# Patient Record
Sex: MALE | Race: WHITE | Employment: PART TIME | URBAN - NONMETROPOLITAN AREA
[De-identification: names, ages, dates, MRNs, and addresses within clinical notes are randomized per-mention and may not be internally consistent; named-entity substitution may affect disease eponyms.]

---

## 2017-10-28 ENCOUNTER — HOSPITAL ENCOUNTER (EMERGENCY)
Facility: HOSPITAL | Age: 27
Discharge: HOME | End: 2017-10-28
Attending: EMERGENCY MEDICINE | Admitting: EMERGENCY MEDICINE
Payer: OTHER MISCELLANEOUS

## 2017-10-28 VITALS
HEART RATE: 96 BPM | SYSTOLIC BLOOD PRESSURE: 116 MMHG | DIASTOLIC BLOOD PRESSURE: 77 MMHG | TEMPERATURE: 98.5 F | WEIGHT: 137 LBS | BODY MASS INDEX: 23.39 KG/M2 | HEIGHT: 64 IN | RESPIRATION RATE: 18 BRPM | OXYGEN SATURATION: 97 %

## 2017-10-28 DIAGNOSIS — M54.9 ACUTE UPPER BACK PAIN: Primary | ICD-10-CM

## 2017-10-28 PROCEDURE — 99284 EMERGENCY DEPT VISIT MOD MDM: CPT | Mod: 25

## 2017-10-28 PROCEDURE — 25000128 H RX IP 250 OP 636: Performed by: EMERGENCY MEDICINE

## 2017-10-28 PROCEDURE — 96372 THER/PROPH/DIAG INJ SC/IM: CPT

## 2017-10-28 PROCEDURE — 99284 EMERGENCY DEPT VISIT MOD MDM: CPT | Performed by: EMERGENCY MEDICINE

## 2017-10-28 RX ORDER — CYCLOBENZAPRINE HCL 10 MG
5-10 TABLET ORAL 3 TIMES DAILY PRN
Qty: 30 TABLET | Refills: 1 | Status: SHIPPED | OUTPATIENT
Start: 2017-10-28 | End: 2017-12-29

## 2017-10-28 RX ORDER — IBUPROFEN 800 MG/1
800 TABLET, FILM COATED ORAL EVERY 8 HOURS PRN
Qty: 60 TABLET | Refills: 0 | Status: SHIPPED | OUTPATIENT
Start: 2017-10-28 | End: 2017-11-05

## 2017-10-28 RX ORDER — KETOROLAC TROMETHAMINE 30 MG/ML
30 INJECTION, SOLUTION INTRAMUSCULAR; INTRAVENOUS ONCE
Status: COMPLETED | OUTPATIENT
Start: 2017-10-28 | End: 2017-10-28

## 2017-10-28 RX ADMIN — KETOROLAC TROMETHAMINE 30 MG: 30 INJECTION, SOLUTION INTRAMUSCULAR at 08:19

## 2017-10-28 NOTE — ED AVS SNAPSHOT
[facility]    [address]    [place] [address]    Phone:  [phone]                                       Ryan Marte   MRN: [id number]    Department:  [facility]   Date of Visit:  10/28/2017           After Visit Summary Signature Page     I have received my discharge instructions, and my questions have been answered. I have discussed any challenges I see with this plan with the nurse or doctor.    ..........................................................................................................................................  Patient/Patient Representative Signature      ..........................................................................................................................................  Patient Representative Print Name and Relationship to Patient    ..................................................               ................................................  Date                                            Time    ..........................................................................................................................................  Reviewed by Signature/Title    ...................................................              ..............................................  Date                                                            Time

## 2017-10-28 NOTE — ED PROVIDER NOTES
"  History     Chief Complaint   Patient presents with     Back Pain     \"upper back pain occurred at work around 0535\"     HPI  Ryan Marte is a 27 year old male who presents to the emergency department from his workplace.  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  He started experiencing intrascapular upper back pain at 5:00 this morning.  Pain is worsened and is rated as 6/10.  Worse when taking deep breaths.  Has not tried any over-the-counter pain medications.  He is walking involves a lot of heavy lifting and did eat a lot of that last night.  This is Worker's Compensation related visit.  The pain is now located in the intrascapular region and is mostly muscular.  Does not radiate, aggravated by twisting movement of the chest wall or taking a deep breath.  No known relieving factors.  Denies any shortness of breath, chest pain, palpitations, orthopnea or paroxysmal nocturnal dyspnea.  No prior cardiac history.  Has never hurt his back in the past.    Problem List:    There are no active problems to display for this patient.       Past Medical History:    No past medical history on file.    Past Surgical History:    Past Surgical History:   Procedure Laterality Date     APPENDECTOMY       BIOPSY OF SKIN LESION         Family History:    No family history on file.    Social History:  Marital Status:  Single [1]  Social History   Substance Use Topics     Smoking status: Never Smoker     Smokeless tobacco: Not on file     Alcohol use Not on file        Medications:      ibuprofen (ADVIL/MOTRIN) 800 MG tablet   cyclobenzaprine (FLEXERIL) 10 MG tablet         Review of Systems   All other systems reviewed and are negative.      Physical Exam   BP: 109/70  Pulse: 96  Heart Rate: 96  Temp: 97.9  F (36.6  C)  Resp: 16  Height: 162.6 cm (5' 4\")  Weight: 62.1 kg (137 lb)  SpO2: 97 %      Physical Exam   Constitutional: He appears well-developed and well-nourished. No distress.   HENT:   Head: " Normocephalic and atraumatic.   Mouth/Throat: Oropharynx is clear and moist. No oropharyngeal exudate.   Eyes: Pupils are equal, round, and reactive to light. No scleral icterus.   Cardiovascular: Normal rate, regular rhythm, normal heart sounds and intact distal pulses.    Pulmonary/Chest: Effort normal and breath sounds normal. No respiratory distress.   Abdominal: Soft. Bowel sounds are normal. There is no tenderness.   Musculoskeletal: He exhibits tenderness. He exhibits no edema.        Back:    Skin: Skin is warm. No rash noted. He is not diaphoretic.   Nursing note and vitals reviewed.      ED Course   Stable ED course.  Toradol IM given in the ED.    ED Course     Procedures         Labs Ordered and Resulted from Time of ED Arrival Up to the Time of Departure from the ED - No data to display    Assessments & Plan (with Medical Decision Making)   Upper back pain: Interscapular pain mostly musculoskeletal.  Patient given Toradol IM in the ED.  Discharged home on Flexeril and Motrin 800 mg every 8 hours as needed for pain.  Advised follow-up with PCP on Monday.  May require physical therapy if he is not better or does not improve in the next few days.  I will answered all questions.  Discharged home.  Work restriction should not lift more than 9 pounds for the next 3 days.    I have reviewed the nursing notes.    I have reviewed the findings, diagnosis, plan and need for follow up with the patient.    Discharge Medication List as of 10/28/2017  8:38 AM      START taking these medications    Details   ibuprofen (ADVIL/MOTRIN) 800 MG tablet Take 1 tablet (800 mg) by mouth every 8 hours as needed for moderate pain, Disp-60 tablet, R-0, E-Prescribe      cyclobenzaprine (FLEXERIL) 10 MG tablet Take 0.5-1 tablets (5-10 mg) by mouth 3 times daily as needed for muscle spasms, Disp-30 tablet, R-1, E-Prescribe             Final diagnoses:   Acute upper back pain       10/28/2017   [facility]     Michael  Montrell MCDONALD MD  10/28/17 1120

## 2017-10-28 NOTE — ED NOTES
Condition stable, understands discharge instructions, prescription x 2 e-scribed to Pharmacy of choice.  Discharged home.

## 2017-10-28 NOTE — DISCHARGE INSTRUCTIONS

## 2017-10-28 NOTE — ED AVS SNAPSHOT
[facility]    [address]    [place] [address]    Phone:  [phone]                                       Ryan Marte   MRN: [id number]    Department:  [facility]   Date of Visit:  10/28/2017           Patient Information     Date Of Birth          1990        Your diagnoses for this visit were:     Acute upper back pain        You were seen by Montrell Rodriguez MD.        Discharge Instructions         General Neck and Back Pain    Both neck and back pain are usually caused by injury to the muscles or ligaments of the spine. Sometimes the disks that separate each bone of the spine may cause pain by pressing on a nearby nerve. Back and neck pain may appear after a sudden twisting or bending force (such as in a car accident), or sometimes after a simple awkward movement. In either case, muscle spasm is often present and adds to the pain.  Acute neck and back pain usually gets better in 1 to 2 weeks. Pain related to disk disease, arthritis in the spinal joints or spinal stenosis (narrowing of the spinal canal) can become chronic and last for months or years.  Back and neck pain are common problems. Most people feel better in 1 or 2 weeks, and most of the rest in 1 to 2 months. Most people can remain active.  People experience and describe pain differently.    Pain can be sharp, stabbing, shooting, aching, cramping, or burning    Movement, standing, bending, lifting, sitting, or walking may worsen the pain    Pain can be localized to one spot or area, or it can be more generalized    Pain can spread or radiate upwards, downwards, to the front, or go down your arms    Muscle spasm may occur.  Most of the time mechanical problems with the muscles or spine cause the pain. it is usually caused by an injury, whether known or not, to the muscles or ligaments. While illnesses can cause back pain, it is usually not caused by a serious illness. Pain is usually related to physical  activity, whether sports, exercise, work, or normal activity. Sometimes it can occur without an identifiable cause. This can happen simply by stretching or moving wrong, without noting pain at the time. Other causes include:    Overexertion, lifting, pushing, pulling incorrectly or too aggressively.    Sudden twisting, bending or stretching from an accident (car or fall), or accidental movement.    Poor posture    Poor conditioning, lack of regular exercise    Spinal disc disease or arthritis    Stress    Pregnancy, or illness like appendicitis, bladder or kidney infection, pelvic infections   Home care    For neck pain: Use a comfortable pillow that supports the head and keeps the spine in a neutral position. The position of the head should not be tilted forward or backward.    When in bed, try to find a position of comfort. A firm mattress is best. Try lying flat on your back with pillows under your knees. You can also try lying on your side with your knees bent up towards your chest and a pillow between your knees.    At first, do not try to stretch out the sore spots. If there is a strain, it is not like the good soreness you get after exercising without an injury. In this case, stretching may make it worse.    Avoid prolonged sitting, long car rides or travel. This puts more stress on the lower back than standing or walking.    During the first 24 to 72 hours after an injury, apply an ice pack to the painful area for 20 minutes and then remove it for 20 minutes over a period of 60 to 90 minutes or several times a day.     You can alternate ice and heat therapies. Talk with your healthcare provider about the best treatment for your back or neck pain. As a safety precaution, do not use a heating pad at bedtime. Sleeping with a heating pad can lead to skin burns or tissue damage.    Therapeutic massage can help relax the back and neck muscles without stretching them.    Be aware of safe lifting methods and do not  lift anything over 15 pounds until all the pain is gone.  Medications  Talk to your healthcare provider before using medicine, especially if you have other medical problems or are taking other medicines.    You may use over-the-counter medicine to control pain, unless another pain medicine was prescribed. If you have chronic conditions like diabetes, liver or kidney disease, stomach ulcers,  gastrointestinal bleeding, or are taking blood thinner medicines.    Be careful if you are given pain medicines, narcotics, or medicine for muscle spasm. They can cause drowsiness, and can affect your coordination, reflexes, and judgment. Do not drive or operate heavy machinery.  Follow-up care  Follow up with your healthcare provider, or as advised. Physical therapy or further tests may be needed.  If X-rays were taken, you will be notified of any new findings that may affect your care.  Call 911  Seek emergency medical care if any of the following occur:    Trouble breathing    Confusion    Very drowsy or trouble awakening    Fainting or loss of consciousness    Rapid or very slow heart rate    Loss of bowel or bladder control  When to seek medical advice  Call your healthcare provider right away if any of these occur:    Pain becomes worse or spreads into your arms or legs    Weakness, numbness or pain in one or both arms or legs    Numbness in the groin area    Difficulty walking    Fever of 100.4 F (38 C) or higher, or as directed by your healthcare provider  Date Last Reviewed: 7/1/2016 2000-2017 [employer]. [address] [place] [address]. All rights reserved. This information is not intended as a substitute for professional medical care. Always follow your healthcare professional's instructions.             Review of your medicines      START taking        Dose / Directions Last dose taken    cyclobenzaprine 10 MG tablet   Commonly known as:  FLEXERIL   Dose:  5-10 mg   Quantity:  30 tablet         Take 0.5-1 tablets (5-10 mg) by mouth 3 times daily as needed for muscle spasms   Refills:  1        ibuprofen 800 MG tablet   Commonly known as:  ADVIL/MOTRIN   Dose:  800 mg   Quantity:  60 tablet        Take 1 tablet (800 mg) by mouth every 8 hours as needed for moderate pain   Refills:  0                Prescriptions were sent or printed at these locations (2 Prescriptions)                   [id number] - [place], [place] - [address]   [address] , [place] [address]    Telephone:  [phone]   Fax:  [phone]   Hours:                  E-Prescribed (2 of 2)         ibuprofen (ADVIL/MOTRIN) 800 MG tablet               cyclobenzaprine (FLEXERIL) 10 MG tablet                Orders Needing Specimen Collection     None      Pending Results     No orders found from 10/26/2017 to 10/29/2017.            Pending Culture Results     No orders found from 10/26/2017 to 10/29/2017.            Thank you for choosing [facility]       Thank you for choosing [facility] for your care. Our goal is always to provide you with excellent care. Hearing back from our patients is one way we can continue to improve our services. Please take a few minutes to complete the written survey that you may receive in the mail after you visit with us. Thank you!        [employer] Information     [employer] gives you secure access to your electronic health record. If you see a primary care provider, you can also send messages to your care team and make appointments. If you have questions, please call your primary care clinic.  If you do not have a primary care provider, please call [phone] and they will assist you.        Care EveryWhere ID     This is your Care EveryWhere ID. This could be used by other organizations to access your [facility] medical records  [id number]        Equal Access to Services     ROMMEL VALENCIA AH: Miguel Yousif, matt valente, shahbaz ba  ah. So [facility] [phone].    ATENCIÓN: Si habla español, tiene a braden disposición servicios gratuitos de asistencia lingüística. Llame al [phone].    We comply with applicable federal civil rights laws and [place] laws. We do not discriminate on the basis of race, color, national origin, age, disability, sex, sexual orientation, or gender identity.            After Visit Summary       This is your record. Keep this with you and show to your community pharmacist(s) and doctor(s) at your next visit.

## 2017-12-29 ENCOUNTER — HOSPITAL ENCOUNTER (EMERGENCY)
Facility: HOSPITAL | Age: 27
Discharge: HOME | End: 2017-12-29
Admitting: FAMILY MEDICINE
Payer: COMMERCIAL

## 2017-12-29 ENCOUNTER — APPOINTMENT (OUTPATIENT)
Dept: GENERAL RADIOLOGY | Facility: HOSPITAL | Age: 27
End: 2017-12-29
Attending: FAMILY MEDICINE
Payer: COMMERCIAL

## 2017-12-29 VITALS
HEIGHT: 64 IN | HEART RATE: 89 BPM | RESPIRATION RATE: 18 BRPM | SYSTOLIC BLOOD PRESSURE: 116 MMHG | DIASTOLIC BLOOD PRESSURE: 69 MMHG | TEMPERATURE: 98.9 F | OXYGEN SATURATION: 96 %

## 2017-12-29 DIAGNOSIS — M94.0 COSTOCHONDRITIS: ICD-10-CM

## 2017-12-29 DIAGNOSIS — R07.89 ATYPICAL CHEST PAIN: ICD-10-CM

## 2017-12-29 LAB
ALBUMIN SERPL-MCNC: 3.8 G/DL (ref 3.4–5)
ALP SERPL-CCNC: 75 U/L (ref 40–150)
ALT SERPL W P-5'-P-CCNC: 40 U/L (ref 0–70)
ANION GAP SERPL CALCULATED.3IONS-SCNC: 9 MMOL/L (ref 3–14)
AST SERPL W P-5'-P-CCNC: 19 U/L (ref 0–45)
BASOPHILS # BLD AUTO: 0 10E9/L (ref 0–0.2)
BASOPHILS NFR BLD AUTO: 0.5 %
BILIRUB SERPL-MCNC: 0.7 MG/DL (ref 0.2–1.3)
BUN SERPL-MCNC: 15 MG/DL (ref 7–30)
CALCIUM SERPL-MCNC: 10.2 MG/DL (ref 8.5–10.1)
CHLORIDE SERPL-SCNC: 105 MMOL/L (ref 94–109)
CO2 SERPL-SCNC: 26 MMOL/L (ref 20–32)
CREAT SERPL-MCNC: 0.74 MG/DL (ref 0.66–1.25)
D DIMER PPP DDU-MCNC: <200 NG/ML D-DU (ref 0–300)
DIFFERENTIAL METHOD BLD: NORMAL
EOSINOPHIL # BLD AUTO: 0.4 10E9/L (ref 0–0.7)
EOSINOPHIL NFR BLD AUTO: 5.5 %
ERYTHROCYTE [DISTWIDTH] IN BLOOD BY AUTOMATED COUNT: 11.8 % (ref 10–15)
FLUAV+FLUBV AG SPEC QL: NEGATIVE
FLUAV+FLUBV AG SPEC QL: NEGATIVE
GFR SERPL CREATININE-BSD FRML MDRD: >90 ML/MIN/1.7M2
GLUCOSE SERPL-MCNC: 85 MG/DL (ref 70–99)
HCT VFR BLD AUTO: 43.2 % (ref 40–53)
HGB BLD-MCNC: 15.3 G/DL (ref 13.3–17.7)
IMM GRANULOCYTES # BLD: 0 10E9/L (ref 0–0.4)
IMM GRANULOCYTES NFR BLD: 0.3 %
LIPASE SERPL-CCNC: 118 U/L (ref 73–393)
LYMPHOCYTES # BLD AUTO: 2.7 10E9/L (ref 0.8–5.3)
LYMPHOCYTES NFR BLD AUTO: 34.4 %
MCH RBC QN AUTO: 28.1 PG (ref 26.5–33)
MCHC RBC AUTO-ENTMCNC: 35.4 G/DL (ref 31.5–36.5)
MCV RBC AUTO: 79 FL (ref 78–100)
MONOCYTES # BLD AUTO: 0.6 10E9/L (ref 0–1.3)
MONOCYTES NFR BLD AUTO: 7.8 %
NEUTROPHILS # BLD AUTO: 4 10E9/L (ref 1.6–8.3)
NEUTROPHILS NFR BLD AUTO: 51.5 %
NRBC # BLD AUTO: 0 10*3/UL
NRBC BLD AUTO-RTO: 0 /100
NT-PROBNP SERPL-MCNC: 29 PG/ML (ref 0–450)
PLATELET # BLD AUTO: 216 10E9/L (ref 150–450)
POTASSIUM SERPL-SCNC: 3.4 MMOL/L (ref 3.4–5.3)
PROT SERPL-MCNC: 7.2 G/DL (ref 6.8–8.8)
RBC # BLD AUTO: 5.44 10E12/L (ref 4.4–5.9)
SODIUM SERPL-SCNC: 140 MMOL/L (ref 133–144)
SPECIMEN SOURCE: NORMAL
TROPONIN I SERPL-MCNC: <0.015 UG/L (ref 0–0.04)
TSH SERPL DL<=0.005 MIU/L-ACNC: 1.58 MU/L (ref 0.4–4)
TSH SERPL DL<=0.005 MIU/L-ACNC: 1.58 MU/L (ref 0.4–4)
WBC # BLD AUTO: 7.8 10E9/L (ref 4–11)

## 2017-12-29 PROCEDURE — 83880 ASSAY OF NATRIURETIC PEPTIDE: CPT | Performed by: FAMILY MEDICINE

## 2017-12-29 PROCEDURE — 80053 COMPREHEN METABOLIC PANEL: CPT | Performed by: FAMILY MEDICINE

## 2017-12-29 PROCEDURE — 84443 ASSAY THYROID STIM HORMONE: CPT | Performed by: FAMILY MEDICINE

## 2017-12-29 PROCEDURE — 83690 ASSAY OF LIPASE: CPT | Performed by: FAMILY MEDICINE

## 2017-12-29 PROCEDURE — 93010 ELECTROCARDIOGRAM REPORT: CPT | Performed by: INTERNAL MEDICINE

## 2017-12-29 PROCEDURE — 84484 ASSAY OF TROPONIN QUANT: CPT | Performed by: FAMILY MEDICINE

## 2017-12-29 PROCEDURE — 87804 INFLUENZA ASSAY W/OPTIC: CPT | Performed by: FAMILY MEDICINE

## 2017-12-29 PROCEDURE — 85379 FIBRIN DEGRADATION QUANT: CPT | Performed by: FAMILY MEDICINE

## 2017-12-29 PROCEDURE — 99285 EMERGENCY DEPT VISIT HI MDM: CPT | Mod: 25

## 2017-12-29 PROCEDURE — 96374 THER/PROPH/DIAG INJ IV PUSH: CPT

## 2017-12-29 PROCEDURE — 71020 XR CHEST 2 VW: CPT | Mod: TC

## 2017-12-29 PROCEDURE — 99285 EMERGENCY DEPT VISIT HI MDM: CPT | Performed by: FAMILY MEDICINE

## 2017-12-29 PROCEDURE — 25000128 H RX IP 250 OP 636: Performed by: FAMILY MEDICINE

## 2017-12-29 PROCEDURE — 36415 COLL VENOUS BLD VENIPUNCTURE: CPT | Performed by: FAMILY MEDICINE

## 2017-12-29 PROCEDURE — 85025 COMPLETE CBC W/AUTO DIFF WBC: CPT | Performed by: FAMILY MEDICINE

## 2017-12-29 PROCEDURE — 93005 ELECTROCARDIOGRAM TRACING: CPT

## 2017-12-29 RX ORDER — KETOROLAC TROMETHAMINE 15 MG/ML
30 INJECTION, SOLUTION INTRAMUSCULAR; INTRAVENOUS ONCE
Status: COMPLETED | OUTPATIENT
Start: 2017-12-29 | End: 2017-12-29

## 2017-12-29 RX ADMIN — KETOROLAC TROMETHAMINE 30 MG: 15 INJECTION, SOLUTION INTRAMUSCULAR; INTRAVENOUS at 05:45

## 2017-12-29 ASSESSMENT — ENCOUNTER SYMPTOMS
AICD PROBLEM: 0
CLAUDICATION: 0
ABDOMINAL PAIN: 0
FATIGUE: 0
ALTERED MENTAL STATUS: 0
EYES NEGATIVE: 1
NEAR-SYNCOPE: 0
SHORTNESS OF BREATH: 0
DIAPHORESIS: 0
SYNCOPE: 0
VOMITING: 0
NAUSEA: 0
ORTHOPNEA: 0
BACK PAIN: 0
PALPITATIONS: 0
HEARTBURN: 0
HEADACHES: 0
FEVER: 0
NUMBNESS: 0
LOWER EXTREMITY EDEMA: 0
ENDOCRINE NEGATIVE: 1
PND: 0
WEAKNESS: 0
COUGH: 0
ANOREXIA: 0
TROUBLE SWALLOWING: 0
DIZZINESS: 1

## 2017-12-29 NOTE — ED AVS SNAPSHOT
[facility]    [address]    [place] [address]    Phone:  [phone]                                       Ryan Marte   MRN: [id number]    Department[facility]  [facility]   Date of Visit:  12/29/2017           After Visit Summary Signature Page     I have received my discharge instructions, and my questions have been answered. I have discussed any challenges I see with this plan with the nurse or doctor.    ..........................................................................................................................................  Patient/Patient Representative Signature      ..........................................................................................................................................  Patient Representative Print Name and Relationship to Patient    ..................................................               ................................................  Date                                            Time    ..........................................................................................................................................  Reviewed by Signature/Title    ...................................................              ..............................................  Date                                                            Time

## 2017-12-29 NOTE — ED AVS SNAPSHOT
[facility]    [address] [place] [address]    Phone:  [phone]                                       Ryan Marte   MRN: [id number]    Department[facility]  [facility]   Date of Visit:  12/29/2017           Patient Information     Date Of Birth          1990        Your diagnoses for this visit were:     Costochondritis     Atypical chest pain       Follow-up Information     Follow up with CARLITA Parkinson MD.    Specialty:  Family Practice    Why:  If symptoms worsen    Contact information:    [address] [address]  [phone]          Discharge Instructions         Chest Wall Pain: Costochondritis    The chest pain that you have had today is caused by costochondritis. This condition is caused by an inflammation of the cartilage joining your ribs to your breastbone. It is not caused by heart or lung problems. Your healthcare team has made sure that the chest pain you feel is not from a life threatening cause of chest pain such as heart attack, collapsed lung, blood clot in the lung, tear in the aorta, or esophageal rupture. The inflammation may have been brought on by a blow to the chest, lifting heavy objects, intense exercise, or an illness that made you cough and sneeze a lot. It often occurs during times of emotional stress. It can be painful, but it is not dangerous. It usually goes away in 1 to 2 weeks. But it may happen again. Rarely, a more serious condition may cause symptoms similar to costochondritis. That s why it s important to watch for the warning signs listed below.  Home care  Follow these guidelines when caring for yourself at home:    If you feel that emotional stress is a cause of your condition, try to figure out the sources of that stress. It may not be obvious. Learn ways to deal with the stress in your life. This can include regular exercise, muscle relaxation, meditation, or simply taking time out for  yourself.    You may use acetaminophen, ibuprofen, or naproxen to control pain, unless another pain medicine was prescribed. If you have liver or kidney disease or ever had a stomach ulcer, talk with your healthcare provider before using these medicines.    You can also help ease pain by using a hot, wet compress or heating pad. Use this with or without a medicated skin cream that helps relieves pain.    Do stretching exercise as advised by your provider.    Take any prescribed medicines as directed.  Follow-up care  Follow up with your healthcare provider, or as advised, if you do not start to get better in the next 2 days.  When to seek medical advice  Call your healthcare provider right away if any of these occur:    A change in the type of pain. Call if it feels different, becomes more serious, lasts longer, or spreads into your shoulder, arm, neck, jaw, or back.    Shortness of breath or pain gets worse when you breathe    Weakness, dizziness, or fainting    Cough with dark-colored sputum (phlegm) or blood    Abdominal pain    Dark red or black stools    Fever of 100.4 F (38 C) or higher, or as directed by your healthcare provider  Date Last Reviewed: 12/1/2016 2000-2017 [employer]. [address] [place] [address]. All rights reserved. This information is not intended as a substitute for professional medical care. Always follow your healthcare professional's instructions.             Review of your medicines      Notice     You have not been prescribed any medications.            Procedures and tests performed during your visit     CBC with platelets differential    Cardiac Continuous Monitoring    Comprehensive metabolic panel    D-Dimer ([facility])    EKG 12 lead    Influenza A/B antigen    Lipase    Nt probnp inpatient (BNP)    TSH    TSH with free T4 reflex    Troponin I    XR Chest 2 Views      Orders Needing Specimen Collection     None      Pending Results     Date and Time Order  Name Status Description    12/29/2017 0458 XR Chest 2 Views In process             Pending Culture Results     No orders found from 12/27/2017 to 12/30/2017.            Thank you for choosing [facility]       Thank you for choosing [facility] for your care. Our goal is always to provide you with excellent care. Hearing back from our patients is one way we can continue to improve our services. Please take a few minutes to complete the written survey that you may receive in the mail after you visit with us. Thank you!        [employer] Information     [employer] gives you secure access to your electronic health record. If you see a primary care provider, you can also send messages to your care team and make appointments. If you have questions, please call your primary care clinic.  If you do not have a primary care provider, please call [phone] and they will assist you.        Care EveryWhere ID     This is your Care EveryWhere ID. This could be used by other organizations to access your [facility] medical records  [id number]        Equal Access to Services     ROMMEL VALENCIA AH: Hadii dieudonne Yousif, matt valente, nkechi olivares, shahbaz donis . So [facility] [phone].    ATENCIÓN: Si habla español, tiene a braden disposición servicios gratuitos de asistencia lingüística. Llame al [phone].    We comply with applicable federal civil rights laws and [place] laws. We do not discriminate on the basis of race, color, national origin, age, disability, sex, sexual orientation, or gender identity.            After Visit Summary       This is your record. Keep this with you and show to your community pharmacist(s) and doctor(s) at your next visit.

## 2017-12-29 NOTE — ED NOTES
Reviewed discharge instructions with pt, verbalized understanding, Copy of discharge instructions provided prior to leaving, no further questions asked.

## 2017-12-29 NOTE — DISCHARGE INSTRUCTIONS
Chest Wall Pain: Costochondritis    The chest pain that you have had today is caused by costochondritis. This condition is caused by an inflammation of the cartilage joining your ribs to your breastbone. It is not caused by heart or lung problems. Your healthcare team has made sure that the chest pain you feel is not from a life threatening cause of chest pain such as heart attack, collapsed lung, blood clot in the lung, tear in the aorta, or esophageal rupture. The inflammation may have been brought on by a blow to the chest, lifting heavy objects, intense exercise, or an illness that made you cough and sneeze a lot. It often occurs during times of emotional stress. It can be painful, but it is not dangerous. It usually goes away in 1 to 2 weeks. But it may happen again. Rarely, a more serious condition may cause symptoms similar to costochondritis. That s why it s important to watch for the warning signs listed below.  Home care  Follow these guidelines when caring for yourself at home:    If you feel that emotional stress is a cause of your condition, try to figure out the sources of that stress. It may not be obvious. Learn ways to deal with the stress in your life. This can include regular exercise, muscle relaxation, meditation, or simply taking time out for yourself.    You may use acetaminophen, ibuprofen, or naproxen to control pain, unless another pain medicine was prescribed. If you have liver or kidney disease or ever had a stomach ulcer, talk with your healthcare provider before using these medicines.    You can also help ease pain by using a hot, wet compress or heating pad. Use this with or without a medicated skin cream that helps relieves pain.    Do stretching exercise as advised by your provider.    Take any prescribed medicines as directed.  Follow-up care  Follow up with your healthcare provider, or as advised, if you do not start to get better in the next 2 days.  When to seek medical  advice  Call your healthcare provider right away if any of these occur:    A change in the type of pain. Call if it feels different, becomes more serious, lasts longer, or spreads into your shoulder, arm, neck, jaw, or back.    Shortness of breath or pain gets worse when you breathe    Weakness, dizziness, or fainting    Cough with dark-colored sputum (phlegm) or blood    Abdominal pain    Dark red or black stools    Fever of 100.4 F (38 C) or higher, or as directed by your healthcare provider  Date Last Reviewed: 12/1/2016 2000-2017 [employer]. [address] [place] [address]. All rights reserved. This information is not intended as a substitute for professional medical care. Always follow your healthcare professional's instructions.

## 2017-12-29 NOTE — ED NOTES
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 States has no pain after waking for his shifts, but pain occurs around 2300 when working past 2 nights. Also reports that is experiencing some left sided arm pain and numbness. Reports it \"feels like heart burn.\" See assessments, call light placed within reach.   "

## 2017-12-29 NOTE — ED PROVIDER NOTES
History     Chief Complaint   Patient presents with     Chest Pain     started yesterday     Patient is a 27 year old male presenting with chest pain.   Chest Pain   Pain location:  L chest  Pain quality: sharp    Pain radiates to:  L arm  Pain severity:  Severe  Onset quality:  Gradual  Duration:  1 day  Timing:  Constant  Progression:  Improving  Chronicity:  New  Context: breathing and movement    Context: not drug use, not eating, not intercourse, not lifting, not raising an arm, not at rest, not stress and not trauma    Relieved by:  Nitroglycerin and aspirin  Worsened by:  Deep breathing and movement  Ineffective treatments:  Antacids  Associated symptoms: anxiety and dizziness    Associated symptoms: no abdominal pain, no AICD problem, no altered mental status, no anorexia, no back pain, no claudication, no cough, no diaphoresis, no dysphagia, no fatigue, no fever, no headache, no heartburn, no lower extremity edema, no nausea, no near-syncope, no numbness, no orthopnea, no palpitations, no PND, no shortness of breath, no syncope, no vomiting and no weakness    Risk factors: male sex    Risk factors: no aortic disease, no birth control, no coronary artery disease, no diabetes mellitus, no Concha-Danlos syndrome, no high cholesterol, no hypertension, no immobilization, no Marfan's syndrome, not obese, not pregnant, no prior DVT/PE, no smoking and no surgery      Ryan Marte is a 27 year old male who presents for chest pain     Problem List:    There are no active problems to display for this patient.       Past Medical History:    History reviewed. No pertinent past medical history.    Past Surgical History:    Past Surgical History:   Procedure Laterality Date     APPENDECTOMY       BIOPSY OF SKIN LESION         Family History:    No family history on file.    Social History:  Marital Status:  Single [1]  Social History   Substance Use Topics     Smoking status: Never Smoker     Smokeless tobacco: Never  "Used     Alcohol use Yes      Comment: \"one yomi's hard lemonade on Sundays\"        Medications:      No current outpatient prescriptions on file.      Review of Systems   Constitutional: Negative for diaphoresis, fatigue and fever.   HENT: Negative.  Negative for trouble swallowing.    Eyes: Negative.    Respiratory: Negative for cough and shortness of breath.    Cardiovascular: Positive for chest pain. Negative for palpitations, orthopnea, claudication, syncope, PND and near-syncope.   Gastrointestinal: Negative for abdominal pain, anorexia, heartburn, nausea and vomiting.   Endocrine: Negative.    Genitourinary: Negative.    Musculoskeletal: Negative for back pain.   Neurological: Positive for dizziness. Negative for weakness, numbness and headaches.       Physical Exam   BP: 119/92  Pulse: 89  Temp: 99.2  F (37.3  C)  Resp: 16  Height: 162.6 cm (5' 4\")  SpO2: 98 %      Physical Exam   Constitutional: He is oriented to person, place, and time. He appears well-developed and well-nourished. No distress.   HENT:   Head: Normocephalic and atraumatic.   Neck: Normal range of motion. Neck supple. No JVD present. No tracheal deviation present. No thyromegaly present.   Cardiovascular: Normal rate, regular rhythm and normal heart sounds.  Exam reveals no gallop and no friction rub.    No murmur heard.  Pulmonary/Chest: Effort normal and breath sounds normal. No respiratory distress. He has no wheezes. He has no rales. He exhibits no tenderness.   Abdominal: Soft. Bowel sounds are normal. He exhibits no distension and no mass. There is no tenderness. There is no rebound and no guarding.   Musculoskeletal: Normal range of motion. He exhibits no edema, tenderness or deformity.   Lymphadenopathy:     He has no cervical adenopathy.   Neurological: He is alert and oriented to person, place, and time. He displays normal reflexes. No cranial nerve deficit. He exhibits normal muscle tone. Coordination normal.   Skin: Skin is " warm. He is not diaphoretic.   Psychiatric: He has a normal mood and affect. His behavior is normal. Judgment and thought content normal.   Nursing note and vitals reviewed.      ED Course     ED Course     Procedures  Patient arrived to ED via EMS with complaint of chest pain . Patient received aspirin and nitroglycerin in The ambulance en route and describes pain as much improved. Medical records reviewed History and exam completed. Labs and diagnostics ordered. . Toradol given with complete resolution of discomfort. Labs and diagnostics returned and reassuring . Patient discharged from ER in stable condition with resolution of chest discomfort and negative cardiac work up               EKG Interpretation:                  Critical Care time:  none             Labs Ordered and Resulted from Time of ED Arrival Up to the Time of Departure from the ED - No data to display    Assessments & Plan (with Medical Decision Making)     I have reviewed the nursing notes.    I have reviewed the findings, diagnosis, plan and need for follow up with the patient.       New Prescriptions    No medications on file       Final diagnoses:   Costochondritis   Atypical chest pain       12/29/2017   [facility]          Elsie Aiken MD  12/29/17 7236

## 2018-08-09 ENCOUNTER — HOSPITAL ENCOUNTER (EMERGENCY)
Facility: HOSPITAL | Age: 28
Discharge: HOME | End: 2018-08-09
Attending: FAMILY MEDICINE | Admitting: FAMILY MEDICINE
Payer: COMMERCIAL

## 2018-08-09 VITALS
DIASTOLIC BLOOD PRESSURE: 79 MMHG | SYSTOLIC BLOOD PRESSURE: 113 MMHG | OXYGEN SATURATION: 99 % | HEART RATE: 83 BPM | RESPIRATION RATE: 16 BRPM | TEMPERATURE: 98 F

## 2018-08-09 DIAGNOSIS — T18.108A ESOPHAGEAL FOREIGN BODY, INITIAL ENCOUNTER: ICD-10-CM

## 2018-08-09 PROCEDURE — 99282 EMERGENCY DEPT VISIT SF MDM: CPT

## 2018-08-09 PROCEDURE — 99282 EMERGENCY DEPT VISIT SF MDM: CPT | Performed by: FAMILY MEDICINE

## 2018-08-09 PROCEDURE — 99281 EMR DPT VST MAYX REQ PHY/QHP: CPT

## 2018-08-09 ASSESSMENT — ENCOUNTER SYMPTOMS
PSYCHIATRIC NEGATIVE: 1
SHORTNESS OF BREATH: 0
ACTIVITY CHANGE: 1
TROUBLE SWALLOWING: 1
GASTROINTESTINAL NEGATIVE: 1
MUSCULOSKELETAL NEGATIVE: 1
NEUROLOGICAL NEGATIVE: 1

## 2018-08-09 NOTE — ED NOTES
Pt denies any airway or throat obstruction at this time. Pt continues to have a sore throat and a HA. Pt will go home and take ibuprofen and sleep.Mom here to take pt home.

## 2018-08-09 NOTE — ED PROVIDER NOTES
"  History     Chief Complaint   Patient presents with     Airway Obstruction     pt swallowed a pill this am around 0830 and it got stuck in his throat. on his way to the hospital the pill cleared and pt is feeling better     HPI  Ryan Marte is a 28 year old male who was taking Excedrin migraine this morning and it got stuck in his throat. this is about 830, he called EMS.  As he got into the leg the blockage cleared but they have brought him to the hospital anyway.  He has an area of pain in his throat where the pill was lodged, a burning sensation, but he is able to swallow fluids without difficulty.  He states this is happened before, he has never been checked out following it as far as an EGD is concerned.  Advised him that he probably should.    Problem List:    There are no active problems to display for this patient.       Past Medical History:    History reviewed. No pertinent past medical history.    Past Surgical History:    Past Surgical History:   Procedure Laterality Date     APPENDECTOMY       BIOPSY OF SKIN LESION         Family History:    No family history on file.    Social History:  Marital Status:  Single [1]  Social History   Substance Use Topics     Smoking status: Never Smoker     Smokeless tobacco: Never Used     Alcohol use Yes      Comment: \"one yomi's hard lemonade on Sundays\"        Medications:      acetaminophen-caffeine (EXCEDRIN TENSION HEADACHE) 500-65 MG TABS         Review of Systems   Constitutional: Positive for activity change.   HENT: Positive for trouble swallowing.         See HPI   Respiratory: Negative for shortness of breath.    Cardiovascular: Negative for chest pain.   Gastrointestinal: Negative.    Musculoskeletal: Negative.    Neurological: Negative.    Psychiatric/Behavioral: Negative.        Physical Exam   BP: 128/78  Pulse: 107  Temp: 98.4  F (36.9  C)  Resp: 16  SpO2: 97 %      Physical Exam   Constitutional: He is oriented to person, place, and time. He " appears well-developed and well-nourished. No distress.   HENT:   Head: Normocephalic and atraumatic.   Mouth/Throat: Oropharynx is clear and moist and mucous membranes are normal.   Neck: Normal range of motion. Neck supple.   Cardiovascular: Normal rate, regular rhythm, normal heart sounds and intact distal pulses.    No murmur heard.  Pulmonary/Chest: Effort normal and breath sounds normal. No respiratory distress.   Musculoskeletal: Normal range of motion.   Neurological: He is alert and oriented to person, place, and time.   Skin: Skin is warm and dry.   Psychiatric: He has a normal mood and affect.   Nursing note and vitals reviewed.      ED Course     ED Course     Procedures    No results found for this or any previous visit (from the past 24 hour(s)).    Medications - No data to display    Assessments & Plan (with Medical Decision Making)   Patient has no further problems.  The pill is gone down, he drank ice water in the emergency room without difficulty.  Advised the patient that he should see primary care and get an EGD arranged so that they can evaluate that spot since it seems to be a ongoing problem, intermittently.  No medications.  Advised soft diet for the remainder of today.    I have reviewed the nursing notes.    I have reviewed the findings, diagnosis, plan and need for follow up with the patient.  New Prescriptions    No medications on file       Final diagnoses:   Esophageal foreign body, initial encounter - resolved prior to arrival in ED       8/9/2018   [facility]     Bonnie Jefferson MD  08/09/18 0970

## 2018-08-09 NOTE — ED AVS SNAPSHOT
[facility]    [address]    [place] [address]    Phone:  [phone]                                       Ryan Marte   MRN: [id number]    Department:  [facility]   Date of Visit:  8/9/2018           After Visit Summary Signature Page     I have received my discharge instructions, and my questions have been answered. I have discussed any challenges I see with this plan with the nurse or doctor.    ..........................................................................................................................................  Patient/Patient Representative Signature      ..........................................................................................................................................  Patient Representative Print Name and Relationship to Patient    ..................................................               ................................................  Date                                            Time    ..........................................................................................................................................  Reviewed by Signature/Title    ...................................................              ..............................................  Date                                                            Time

## 2018-08-09 NOTE — ED NOTES
Pt able to hold down 8 oz of water without difficulty. And feels back to normal except reports sore throat at this time.

## 2018-08-09 NOTE — ED AVS SNAPSHOT
[facility]    [address] [place] [address]    Phone:  [phone]                                       Ryan Marte   MRN: [id number]    Department:  [facility]   Date of Visit:  8/9/2018           Patient Information     Date Of Birth          1990        Your diagnoses for this visit were:     Esophageal foreign body, initial encounter resolved prior to arrival in ED       You were seen by Bonnie Jefferson MD.      Follow-up Information     Follow up with Ching Brown NP.    Specialty:  Nurse Practitioner    Why:  As needed, If symptoms worsen, or fail to improve    Contact information:    [facility]  [address] [facility] [id number] [phone]          Discharge Instructions         Esophageal Blockage, Resolved  The esophagus is the passage that carries food from the mouth to the stomach. You had a blockage in the esophagus. This can happen after swallowing a large piece of food, taking a large pill, or swallowing foreign objects.  If this is a recurring problem, it can be a sign of disease in the esophagus, such as inflammation (swelling and irritation) or scarring. If you did not have a special procedure (endoscopy) today to treat your condition, further testing will be needed to evaluate this problem.  The blockage has cleared. You should be able to swallow normally again.  Home care    For the next 24 hours you may drink liquids and eat soft foods.    You may have been given medicine today to prevent pain and help you relax. If so, you may feel drowsy for the next 4 to 12 hours. Do not drive or operate dangerous equipment until you feel alert again.    If your esophagus was blocked by food, be sure to cut solid food into small pieces before putting it into your mouth. Chew all foods well before swallowing.    If your esophagus was blocked by an over-the-counter pill (such as a vitamin), avoid this size pill in the future.  If it was blocked by a prescription medicine, ask your healthcare provider for another form of medicine.  Follow-up care  Follow up with your healthcare provider, or as advised. If you continue to have problems, contact your doctor or this facility for advice. If this is a recurring problem, talk with your healthcare provider about it. He or she may suggest having an endoscopy. This is a look in the esophagus with a small camera and light in a narrow, flexible tube.  When to seek medical advice  Call your healthcare provider right away if any of these occur:    Unable to swallow    Significant pain on swallowing    Fever of 100.4 F (38 C) or higher, or as directed by your healthcare provider  Call 911  Call 911 if any of the following occur:     Chest pain or shortness of breath    Vomiting blood (red or black)    Blood in your stool (dark red or black color)   Date Last Reviewed: 5/1/2017 2000-2017 [employer]. [address] [place] [address]. All rights reserved. This information is not intended as a substitute for professional medical care. Always follow your healthcare professional's instructions.             Review of your medicines      Our records show that you are taking the medicines listed below. If these are incorrect, please call your family doctor or clinic.        Dose / Directions Last dose taken    acetaminophen-caffeine 500-65 MG Tabs   Commonly known as:  EXCEDRIN TENSION HEADACHE   Dose:  2 tablet        Take 2 tablets by mouth every 6 hours as needed for mild pain   Refills:  0                Orders Needing Specimen Collection     None      Pending Results     No orders found from 8/7/2018 to 8/10/2018.            Pending Culture Results     No orders found from 8/7/2018 to 8/10/2018.            Thank you for choosing [facility]       Thank you for choosing [facility] for your care. Our goal is always to provide you with excellent care. Hearing back from our patients is one  way we can continue to improve our services. Please take a few minutes to complete the written survey that you may receive in the mail after you visit with us. Thank you!        [email or web address] Information     [facility] gives you secure access to your electronic health record. If you see a primary care provider, you can also send messages to your care team and make appointments. If you have questions, please call your primary care clinic.  If you do not have a primary care provider, please call [phone] and they will assist you.        Care EveryWhere ID     This is your Care EveryWhere ID. This could be used by other organizations to access your [facility] medical records  [id number]        Equal Access to Services     ROMMEL VALENCIA : Miugel Yousif, matt valente, nkechi olivares, shahbaz marshall. So [facility] [phone].    ATENCIÓN: Si habla español, tiene a braden disposición servicios gratuitos de asistencia lingüística. Llame al [phone].    We comply with applicable federal civil rights laws and [place] laws. We do not discriminate on the basis of race, color, national origin, age, disability, sex, sexual orientation, or gender identity.            After Visit Summary       This is your record. Keep this with you and show to your community pharmacist(s) and doctor(s) at your next visit.

## 2018-08-09 NOTE — DISCHARGE INSTRUCTIONS
Esophageal Blockage, Resolved  The esophagus is the passage that carries food from the mouth to the stomach. You had a blockage in the esophagus. This can happen after swallowing a large piece of food, taking a large pill, or swallowing foreign objects.  If this is a recurring problem, it can be a sign of disease in the esophagus, such as inflammation (swelling and irritation) or scarring. If you did not have a special procedure (endoscopy) today to treat your condition, further testing will be needed to evaluate this problem.  The blockage has cleared. You should be able to swallow normally again.  Home care    For the next 24 hours you may drink liquids and eat soft foods.    You may have been given medicine today to prevent pain and help you relax. If so, you may feel drowsy for the next 4 to 12 hours. Do not drive or operate dangerous equipment until you feel alert again.    If your esophagus was blocked by food, be sure to cut solid food into small pieces before putting it into your mouth. Chew all foods well before swallowing.    If your esophagus was blocked by an over-the-counter pill (such as a vitamin), avoid this size pill in the future. If it was blocked by a prescription medicine, ask your healthcare provider for another form of medicine.  Follow-up care  Follow up with your healthcare provider, or as advised. If you continue to have problems, contact your doctor or this facility for advice. If this is a recurring problem, talk with your healthcare provider about it. He or she may suggest having an endoscopy. This is a look in the esophagus with a small camera and light in a narrow, flexible tube.  When to seek medical advice  Call your healthcare provider right away if any of these occur:    Unable to swallow    Significant pain on swallowing    Fever of 100.4 F (38 C) or higher, or as directed by your healthcare provider  Call 911  Call 911 if any of the following occur:     Chest pain or shortness  of breath    Vomiting blood (red or black)    Blood in your stool (dark red or black color)   Date Last Reviewed: 5/1/2017 2000-2017 [employer] [address] [place] [address], [place] [address]. All rights reserved. This information is not intended as a substitute for professional medical care. Always follow your healthcare professional's instructions.

## 2019-05-07 ENCOUNTER — HOSPITAL ENCOUNTER (EMERGENCY)
Facility: HOSPITAL | Age: 29
Discharge: HOME | End: 2019-05-07
Attending: NURSE PRACTITIONER | Admitting: NURSE PRACTITIONER
Payer: COMMERCIAL

## 2019-05-07 ENCOUNTER — APPOINTMENT (OUTPATIENT)
Dept: GENERAL RADIOLOGY | Facility: HOSPITAL | Age: 29
End: 2019-05-07
Attending: NURSE PRACTITIONER
Payer: COMMERCIAL

## 2019-05-07 VITALS
RESPIRATION RATE: 16 BRPM | DIASTOLIC BLOOD PRESSURE: 72 MMHG | TEMPERATURE: 96.7 F | SYSTOLIC BLOOD PRESSURE: 120 MMHG | OXYGEN SATURATION: 100 %

## 2019-05-07 DIAGNOSIS — Q65.89 CONGENITAL DYSPLASIA OF RIGHT HIP: ICD-10-CM

## 2019-05-07 PROCEDURE — G0463 HOSPITAL OUTPT CLINIC VISIT: HCPCS | Mod: 25

## 2019-05-07 PROCEDURE — 99213 OFFICE O/P EST LOW 20 MIN: CPT | Mod: Z6 | Performed by: NURSE PRACTITIONER

## 2019-05-07 PROCEDURE — 96372 THER/PROPH/DIAG INJ SC/IM: CPT

## 2019-05-07 PROCEDURE — 73502 X-RAY EXAM HIP UNI 2-3 VIEWS: CPT | Mod: TC

## 2019-05-07 PROCEDURE — 25000128 H RX IP 250 OP 636: Performed by: NURSE PRACTITIONER

## 2019-05-07 RX ORDER — KETOROLAC TROMETHAMINE 30 MG/ML
60 INJECTION, SOLUTION INTRAMUSCULAR; INTRAVENOUS ONCE
Status: COMPLETED | OUTPATIENT
Start: 2019-05-07 | End: 2019-05-07

## 2019-05-07 RX ORDER — KETOROLAC TROMETHAMINE 10 MG/1
10 TABLET, FILM COATED ORAL EVERY 6 HOURS PRN
Qty: 20 TABLET | Refills: 0 | Status: SHIPPED | OUTPATIENT
Start: 2019-05-07 | End: 2019-08-11

## 2019-05-07 RX ADMIN — KETOROLAC TROMETHAMINE 60 MG: 30 INJECTION INTRAMUSCULAR at 10:12

## 2019-05-07 ASSESSMENT — ENCOUNTER SYMPTOMS
FATIGUE: 0
FEVER: 0
APPETITE CHANGE: 0
CHILLS: 0
ARTHRALGIAS: 1

## 2019-05-07 NOTE — ED TRIAGE NOTES
"Patient presents for evaluation of right hip pain.  States when he was 15 he had a dodge ball injury and was told that his hip joint was not normal and that he would need a hip replacement by [age].  Patient states he has noticed consistently worsening pain over the last month.  When he lays down the pain is at it's worse \"feels like my leg is coming apart\" because of the pain he is not sleeping well.    "

## 2019-05-07 NOTE — ED PROVIDER NOTES
"  History     Chief Complaint   Patient presents with     Hip Pain     HPI  Ryan Marte is a 29 year old male who presents today with a CC of right hip pain infrequently x last couple of years, he notes a flair over the past month or so.  He denies change in activity level or recent injury.  No fevers or chills, no numbness, tingling, weakness.   He has been taking ibuprofen 800 mg.  He states it had been working well but over the last week it hasn't been helping with the pain as well.      He works as a nursing assistant.  He does a lot of lifting, pushing, pulling.  He works 24 hours per week.      He has a history of right hip injury at the age 15, had a hip x-ray and was told he had a congenital anomaly, will likely need a hip replacement in his 30's.  Has not had any problems since.      Allergies:  No Known Allergies    Problem List:    There are no active problems to display for this patient.       Past Medical History:    History reviewed. No pertinent past medical history.    Past Surgical History:    Past Surgical History:   Procedure Laterality Date     APPENDECTOMY       BIOPSY OF SKIN LESION         Family History:    No family history on file.    Social History:  Marital Status:  Single [1]  Social History     Tobacco Use     Smoking status: Never Smoker     Smokeless tobacco: Never Used   Substance Use Topics     Alcohol use: Yes     Comment: \"one yomi's hard lemonade on Sundays\"     Drug use: No        Medications:      ketorolac (TORADOL) 10 MG tablet         Review of Systems   Constitutional: Negative for appetite change, chills, fatigue and fever.   Musculoskeletal: Positive for arthralgias.       Physical Exam   BP: 120/72  Heart Rate: 85  Temp: 96.7  F (35.9  C)  Resp: 16  SpO2: 100 %      Physical Exam   Constitutional: He appears well-developed. He is cooperative. He does not appear ill.   Cardiovascular: Normal rate.   Pulmonary/Chest: Effort normal.   Musculoskeletal:        Right hip: He " exhibits bony tenderness (trochanter and anterior hip/groin). He exhibits normal range of motion, normal strength, no swelling, no crepitus, no deformity and no laceration.   Right hip: skin intact without erythema, edema, and ecchymosis.  Skin is normothermic.  Right pedal pulse 2+, sensation grossly intact to light touch, warm/cold, capillary refill < 2 seconds   Neurological: He is alert.   Skin: Skin is warm and dry.   Psychiatric: He has a normal mood and affect. His behavior is normal.   Nursing note and vitals reviewed.      ED Course        Procedures    Results for orders placed or performed during the hospital encounter of 05/07/19   XR Hip Right 2-3 Views    Narrative    PROCEDURE:  XR HIP RIGHT 2-3 VIEWS    HISTORY: right hip pain x 1 year    COMPARISON:  None.    TECHNIQUE:  2 views of the right hip were obtained.    FINDINGS:  There is right hip dysplasia. The articular space is normal  in height.       Impression    IMPRESSION: Right hip dysplasia      ALANNA GARNICA MD     Medications   ketorolac (TORADOL) injection 60 mg (60 mg Intramuscular Given 5/7/19 1012)     Observed for a minimum of 20 minutes, tolerated medications well, no adverse efects noted, reports pain is 1/10 on discharge.    Assessments & Plan (with Medical Decision Making)     I have reviewed the nursing notes.    I have reviewed the findings, diagnosis, plan and need for follow up with the patient.  Talked with Ching Brown, she recommends [facility] for [facility]o Consult    ASSESSMENT / PLAN:  (Q65.89) Congenital dysplasia of right hip  Comment: with acute hip pain, n/v intact, no joint redness or swelling  Plan:  Toradol as prescribed  Rest, ice as needed for symptomatic treatment  A disc of his right hip x-ray was given to him to bring to Ortho Consult  Call today to schedule an appointment with Orthopedics  Return to ED with worsening of symptoms or new concerns       Medication List      Started    ketorolac  10 MG tablet  Commonly known as:  TORADOL  10 mg, Oral, EVERY 6 HOURS PRN            Final diagnoses:   Congenital dysplasia of right hip       5/7/2019   [facility]     Betina Prado NP  05/08/19 8853

## 2019-05-07 NOTE — ED AVS SNAPSHOT
[facility]  [address]  [place] [address]  Phone:  [phone]                                    Ryan Marte   MRN: [id number]    Department:  [facility]   Date of Visit:  5/7/2019           After Visit Summary Signature Page    I have received my discharge instructions, and my questions have been answered. I have discussed any challenges I see with this plan with the nurse or doctor.    ..........................................................................................................................................  Patient/Patient Representative Signature      ..........................................................................................................................................  Patient Representative Print Name and Relationship to Patient    ..................................................               ................................................  Date                                   Time    ..........................................................................................................................................  Reviewed by Signature/Title    ...................................................              ..............................................  Date                                               Time          22EPIC Rev 08/18

## 2019-05-07 NOTE — DISCHARGE INSTRUCTIONS
I have placed a call to Ching Brown, I will either have her office call you or call you with a plan when she calls me back

## 2019-08-11 ENCOUNTER — HOSPITAL ENCOUNTER (EMERGENCY)
Facility: HOSPITAL | Age: 29
Discharge: HOME | End: 2019-08-11
Attending: NURSE PRACTITIONER | Admitting: NURSE PRACTITIONER
Payer: COMMERCIAL

## 2019-08-11 VITALS
RESPIRATION RATE: 16 BRPM | TEMPERATURE: 97.4 F | SYSTOLIC BLOOD PRESSURE: 127 MMHG | HEART RATE: 97 BPM | OXYGEN SATURATION: 100 % | DIASTOLIC BLOOD PRESSURE: 79 MMHG

## 2019-08-11 DIAGNOSIS — L27.0 ALLERGIC DRUG RASH: ICD-10-CM

## 2019-08-11 PROCEDURE — 99213 OFFICE O/P EST LOW 20 MIN: CPT | Performed by: NURSE PRACTITIONER

## 2019-08-11 PROCEDURE — G0463 HOSPITAL OUTPT CLINIC VISIT: HCPCS

## 2019-08-11 ASSESSMENT — ENCOUNTER SYMPTOMS
RESPIRATORY NEGATIVE: 1
NEUROLOGICAL NEGATIVE: 1
GASTROINTESTINAL NEGATIVE: 1
CONSTITUTIONAL NEGATIVE: 1
COLOR CHANGE: 1

## 2019-08-11 NOTE — ED TRIAGE NOTES
Pr presents today alone for a rash to his upper arms, he was treated for scabies yesterday and had the cream on he says for 9 hours and then washed it off.

## 2019-08-11 NOTE — ED PROVIDER NOTES
"  History     Chief Complaint   Patient presents with     Rash     to arms this AM after being treated with permethrin     HPI  Ryan Marte is a 29 year old male who presents with a red, fine rash on his arms after applying Permethrin, yesterday, to kill scabies. His place of employment has been under quarantine for a scabies outbreak. He has not been there in over one month, but everyone is being treated for scabies. Denies fevers, chills, nausea, vomiting, diarrhea, and shortness of breath.      Allergies:  Allergies   Allergen Reactions     Permethrin      rash       Problem List:    There are no active problems to display for this patient.       Past Medical History:    No past medical history on file.    Past Surgical History:    Past Surgical History:   Procedure Laterality Date     APPENDECTOMY       BIOPSY OF SKIN LESION         Family History:    No family history on file.    Social History:  Marital Status:  Single [1]  Social History     Tobacco Use     Smoking status: Never Smoker     Smokeless tobacco: Never Used   Substance Use Topics     Alcohol use: Yes     Comment: \"one yomi's hard lemonade on Sundays\"     Drug use: No        Medications:      No current outpatient medications on file.      Review of Systems   Constitutional: Negative.    Respiratory: Negative.    Gastrointestinal: Negative.    Skin: Positive for color change and rash.   Neurological: Negative.        Physical Exam   BP: 127/79  Pulse: 97  Temp: 97.4  F (36.3  C)  Resp: 16  SpO2: 100 %      Physical Exam   Constitutional: He is oriented to person, place, and time. He appears well-developed and well-nourished. No distress.   Pulmonary/Chest: Effort normal.   Neurological: He is alert and oriented to person, place, and time.   Skin: Skin is warm and dry. Rash noted. Rash is macular. There is erythema.        Psychiatric: He has a normal mood and affect.   Nursing note and vitals reviewed.      ED Course        Procedures        "     No results found for this or any previous visit (from the past 24 hour(s)).    Medications - No data to display    Assessments & Plan (with Medical Decision Making)     I have reviewed the nursing notes.    I have reviewed the findings, diagnosis, plan and need for follow up with the patient.  Fine, erythematous, mauclar rash appeared after application of Permethrin. Suggested taking ranitidine and benadryl or Zyrtec if pruritis occurs. Explained the  need to launder his clothes the same day he is using a medication to kill scabies. He has not been to work in one month and does not have pruritic lesions so he probably is not infested with scabies. His place of employment is under quarantine for scabies and everyone is being treated. Discharge instructions and medications reviewed with him and understanding verbalized.       There are no discharge medications for this patient.      Final diagnoses:   Allergic drug rash       8/11/2019   [facility]     Almaz Glaser, CNP  08/11/19 9553

## 2019-08-11 NOTE — DISCHARGE INSTRUCTIONS
You may use Zyrtec  10 mg in the am or benadryl 25 to 50 mg for itching.  Zantac  150 to 300 mg daily may decrease itching and allergic reaction.  May use hydrocortisone creme or benadryl creme for itching. Do not use both benadryl creme and oral benadryl. Oatmeal baths may help itching.

## 2019-08-11 NOTE — ED AVS SNAPSHOT
[facility]  [address]  [place] [address]  Phone:  [phone]                                    Ryan Marte   MRN: [id number]    Department:  [facility]   Date of Visit:  8/11/2019           After Visit Summary Signature Page    I have received my discharge instructions, and my questions have been answered. I have discussed any challenges I see with this plan with the nurse or doctor.    ..........................................................................................................................................  Patient/Patient Representative Signature      ..........................................................................................................................................  Patient Representative Print Name and Relationship to Patient    ..................................................               ................................................  Date                                   Time    ..........................................................................................................................................  Reviewed by Signature/Title    ...................................................              ..............................................  Date                                               Time          22EPIC Rev 08/18

## 2019-10-20 ENCOUNTER — HOSPITAL ENCOUNTER (EMERGENCY)
Facility: HOSPITAL | Age: 29
Discharge: HOME | End: 2019-10-20
Attending: FAMILY MEDICINE | Admitting: FAMILY MEDICINE
Payer: COMMERCIAL

## 2019-10-20 ENCOUNTER — APPOINTMENT (OUTPATIENT)
Dept: CT IMAGING | Facility: HOSPITAL | Age: 29
End: 2019-10-20
Attending: FAMILY MEDICINE
Payer: COMMERCIAL

## 2019-10-20 VITALS
OXYGEN SATURATION: 100 % | BODY MASS INDEX: 24.25 KG/M2 | WEIGHT: 141.3 LBS | RESPIRATION RATE: 16 BRPM | HEART RATE: 96 BPM | DIASTOLIC BLOOD PRESSURE: 80 MMHG | SYSTOLIC BLOOD PRESSURE: 123 MMHG | TEMPERATURE: 98.2 F

## 2019-10-20 DIAGNOSIS — R19.7 DIARRHEA, UNSPECIFIED TYPE: ICD-10-CM

## 2019-10-20 LAB
ALBUMIN SERPL-MCNC: 4 G/DL (ref 3.4–5)
ALBUMIN UR-MCNC: NEGATIVE MG/DL
ALP SERPL-CCNC: 81 U/L (ref 40–150)
ALT SERPL W P-5'-P-CCNC: 50 U/L (ref 0–70)
AMPHETAMINES UR QL: NOT DETECTED NG/ML
ANION GAP SERPL CALCULATED.3IONS-SCNC: 4 MMOL/L (ref 3–14)
APPEARANCE UR: CLEAR
AST SERPL W P-5'-P-CCNC: 20 U/L (ref 0–45)
BACTERIA #/AREA URNS HPF: ABNORMAL /HPF
BARBITURATES UR QL SCN: NOT DETECTED NG/ML
BASOPHILS # BLD AUTO: 0 10E9/L (ref 0–0.2)
BASOPHILS NFR BLD AUTO: 0.3 %
BENZODIAZ UR QL SCN: NOT DETECTED NG/ML
BILIRUB SERPL-MCNC: 0.7 MG/DL (ref 0.2–1.3)
BILIRUB UR QL STRIP: NEGATIVE
BUN SERPL-MCNC: 17 MG/DL (ref 7–30)
BUPRENORPHINE UR QL: NOT DETECTED NG/ML
CALCIUM SERPL-MCNC: 10.2 MG/DL (ref 8.5–10.1)
CANNABINOIDS UR QL: NOT DETECTED NG/ML
CHLORIDE SERPL-SCNC: 108 MMOL/L (ref 94–109)
CO2 SERPL-SCNC: 27 MMOL/L (ref 20–32)
COCAINE UR QL SCN: NOT DETECTED NG/ML
COLOR UR AUTO: ABNORMAL
CREAT SERPL-MCNC: 0.71 MG/DL (ref 0.66–1.25)
CRP SERPL-MCNC: 6.2 MG/L (ref 0–8)
D-METHAMPHET UR QL: NOT DETECTED NG/ML
DIFFERENTIAL METHOD BLD: NORMAL
EOSINOPHIL # BLD AUTO: 0.1 10E9/L (ref 0–0.7)
EOSINOPHIL NFR BLD AUTO: 1.1 %
ERYTHROCYTE [DISTWIDTH] IN BLOOD BY AUTOMATED COUNT: 11.8 % (ref 10–15)
GFR SERPL CREATININE-BSD FRML MDRD: >90 ML/MIN/{1.73_M2}
GLUCOSE SERPL-MCNC: 97 MG/DL (ref 70–99)
GLUCOSE UR STRIP-MCNC: NEGATIVE MG/DL
HCT VFR BLD AUTO: 42.6 % (ref 40–53)
HEMOCCULT STL QL IA: NEGATIVE
HGB BLD-MCNC: 14.6 G/DL (ref 13.3–17.7)
HGB UR QL STRIP: NEGATIVE
IMM GRANULOCYTES # BLD: 0 10E9/L (ref 0–0.4)
IMM GRANULOCYTES NFR BLD: 0.3 %
INR PPP: 1.02 (ref 0.8–1.2)
KETONES UR STRIP-MCNC: NEGATIVE MG/DL
LACTATE BLD-SCNC: 1.7 MMOL/L (ref 0.7–2)
LEUKOCYTE ESTERASE UR QL STRIP: NEGATIVE
LIPASE SERPL-CCNC: 86 U/L (ref 73–393)
LYMPHOCYTES # BLD AUTO: 1.7 10E9/L (ref 0.8–5.3)
LYMPHOCYTES NFR BLD AUTO: 23.1 %
MCH RBC QN AUTO: 27.1 PG (ref 26.5–33)
MCHC RBC AUTO-ENTMCNC: 34.3 G/DL (ref 31.5–36.5)
MCV RBC AUTO: 79 FL (ref 78–100)
METHADONE UR QL SCN: NOT DETECTED NG/ML
MONOCYTES # BLD AUTO: 0.6 10E9/L (ref 0–1.3)
MONOCYTES NFR BLD AUTO: 8.4 %
MUCOUS THREADS #/AREA URNS LPF: PRESENT /LPF
NEUTROPHILS # BLD AUTO: 4.8 10E9/L (ref 1.6–8.3)
NEUTROPHILS NFR BLD AUTO: 66.8 %
NITRATE UR QL: NEGATIVE
NRBC # BLD AUTO: 0 10*3/UL
NRBC BLD AUTO-RTO: 0 /100
OPIATES UR QL SCN: NOT DETECTED NG/ML
OXYCODONE UR QL SCN: NOT DETECTED NG/ML
PCP UR QL SCN: NOT DETECTED NG/ML
PH UR STRIP: 8 PH (ref 4.7–8)
PLATELET # BLD AUTO: 232 10E9/L (ref 150–450)
POTASSIUM SERPL-SCNC: 3.4 MMOL/L (ref 3.4–5.3)
PROPOXYPH UR QL: NOT DETECTED NG/ML
PROT SERPL-MCNC: 7.1 G/DL (ref 6.8–8.8)
RBC # BLD AUTO: 5.38 10E12/L (ref 4.4–5.9)
RBC #/AREA URNS AUTO: 0 /HPF (ref 0–2)
SODIUM SERPL-SCNC: 139 MMOL/L (ref 133–144)
SOURCE: ABNORMAL
SP GR UR STRIP: 1.02 (ref 1–1.03)
TRICYCLICS UR QL SCN: NOT DETECTED NG/ML
UROBILINOGEN UR STRIP-MCNC: NORMAL MG/DL (ref 0–2)
WBC # BLD AUTO: 7.2 10E9/L (ref 4–11)
WBC #/AREA URNS AUTO: 1 /HPF (ref 0–5)

## 2019-10-20 PROCEDURE — 83690 ASSAY OF LIPASE: CPT | Performed by: FAMILY MEDICINE

## 2019-10-20 PROCEDURE — 25000128 H RX IP 250 OP 636: Performed by: FAMILY MEDICINE

## 2019-10-20 PROCEDURE — 83605 ASSAY OF LACTIC ACID: CPT | Performed by: FAMILY MEDICINE

## 2019-10-20 PROCEDURE — 85610 PROTHROMBIN TIME: CPT | Performed by: FAMILY MEDICINE

## 2019-10-20 PROCEDURE — 96374 THER/PROPH/DIAG INJ IV PUSH: CPT | Mod: XU

## 2019-10-20 PROCEDURE — 93010 ELECTROCARDIOGRAM REPORT: CPT | Performed by: INTERNAL MEDICINE

## 2019-10-20 PROCEDURE — 36415 COLL VENOUS BLD VENIPUNCTURE: CPT | Performed by: FAMILY MEDICINE

## 2019-10-20 PROCEDURE — 93005 ELECTROCARDIOGRAM TRACING: CPT

## 2019-10-20 PROCEDURE — 81001 URINALYSIS AUTO W/SCOPE: CPT | Mod: 59 | Performed by: FAMILY MEDICINE

## 2019-10-20 PROCEDURE — 80306 DRUG TEST PRSMV INSTRMNT: CPT | Performed by: FAMILY MEDICINE

## 2019-10-20 PROCEDURE — 80053 COMPREHEN METABOLIC PANEL: CPT | Performed by: FAMILY MEDICINE

## 2019-10-20 PROCEDURE — 85025 COMPLETE CBC W/AUTO DIFF WBC: CPT | Performed by: FAMILY MEDICINE

## 2019-10-20 PROCEDURE — 25500064 ZZH RX 255 OP 636: Performed by: FAMILY MEDICINE

## 2019-10-20 PROCEDURE — 99285 EMERGENCY DEPT VISIT HI MDM: CPT | Mod: 25

## 2019-10-20 PROCEDURE — 99285 EMERGENCY DEPT VISIT HI MDM: CPT | Mod: Z6 | Performed by: FAMILY MEDICINE

## 2019-10-20 PROCEDURE — 82274 ASSAY TEST FOR BLOOD FECAL: CPT | Performed by: FAMILY MEDICINE

## 2019-10-20 PROCEDURE — 86140 C-REACTIVE PROTEIN: CPT | Performed by: FAMILY MEDICINE

## 2019-10-20 PROCEDURE — C9113 INJ PANTOPRAZOLE SODIUM, VIA: HCPCS | Performed by: FAMILY MEDICINE

## 2019-10-20 PROCEDURE — 74177 CT ABD & PELVIS W/CONTRAST: CPT | Mod: TC

## 2019-10-20 RX ORDER — SODIUM CHLORIDE 9 MG/ML
1000 INJECTION, SOLUTION INTRAVENOUS CONTINUOUS
Status: DISCONTINUED | OUTPATIENT
Start: 2019-10-20 | End: 2019-10-20 | Stop reason: HOSPADM

## 2019-10-20 RX ORDER — IOPAMIDOL 612 MG/ML
100 INJECTION, SOLUTION INTRAVASCULAR ONCE
Status: COMPLETED | OUTPATIENT
Start: 2019-10-20 | End: 2019-10-20

## 2019-10-20 RX ORDER — COVID-19 ANTIGEN TEST
220 KIT MISCELLANEOUS 2 TIMES DAILY PRN
COMMUNITY
End: 2023-11-19

## 2019-10-20 RX ADMIN — IOPAMIDOL 100 ML: 612 INJECTION, SOLUTION INTRAVENOUS at 15:47

## 2019-10-20 RX ADMIN — SODIUM CHLORIDE 1000 ML: 9 INJECTION, SOLUTION INTRAVENOUS at 15:31

## 2019-10-20 RX ADMIN — PANTOPRAZOLE SODIUM 40 MG: 40 INJECTION, POWDER, FOR SOLUTION INTRAVENOUS at 15:31

## 2019-10-20 ASSESSMENT — ENCOUNTER SYMPTOMS
MUSCULOSKELETAL NEGATIVE: 1
CARDIOVASCULAR NEGATIVE: 1
HEADACHES: 0
NECK STIFFNESS: 0
DIFFICULTY URINATING: 0
NEUROLOGICAL NEGATIVE: 1
ARTHRALGIAS: 0
PSYCHIATRIC NEGATIVE: 1
ABDOMINAL PAIN: 0
COLOR CHANGE: 0
DIARRHEA: 1
SHORTNESS OF BREATH: 0
CONFUSION: 0
CONSTITUTIONAL NEGATIVE: 1
ALLERGIC/IMMUNOLOGIC NEGATIVE: 1
RESPIRATORY NEGATIVE: 1
HEMATOLOGIC/LYMPHATIC NEGATIVE: 1
BLOOD IN STOOL: 1
EYES NEGATIVE: 1
FEVER: 0
EYE REDNESS: 0
ENDOCRINE NEGATIVE: 1

## 2019-10-20 NOTE — ED TRIAGE NOTES
C/o abdominal pain near belly button rated 8/10 when cramping and 2/10 when no cramping present. States has had 13 loose stools today with first 5 being very loose and last 8 being pure blood. States it was a small amount of blood each time. Denies history of rectal bleeding. Denies history of crohns disease or colitis. Denies having any hemorrhoids.

## 2019-10-20 NOTE — ED AVS SNAPSHOT
[facility]  [address]  [place] [address]  Phone:  [phone]                                    Ryan Marte   MRN: [id number]    Department:  [facility]   Date of Visit:  10/20/2019           After Visit Summary Signature Page    I have received my discharge instructions, and my questions have been answered. I have discussed any challenges I see with this plan with the nurse or doctor.    ..........................................................................................................................................  Patient/Patient Representative Signature      ..........................................................................................................................................  Patient Representative Print Name and Relationship to Patient    ..................................................               ................................................  Date                                   Time    ..........................................................................................................................................  Reviewed by Signature/Title    ...................................................              ..............................................  Date                                               Time          22EPIC Rev 08/18

## 2019-10-20 NOTE — ED PROVIDER NOTES
"  History     Chief Complaint   Patient presents with     Abdominal Pain     c/o abdominal pain by belly button that started this morning at [facility]. Reports had 5 \"very very loose stools.\" States has now gone to the bathroom 8 times since and they were \"pure blood and no stool.\"      HPI  Ryan Marte is a 29 year old male who presents to the emergency room with a complaint of abdominal cramping and multiple loose stools that started about 4 hours ago.  He said some of been bloody.  No history of Crohn's or other problems.  No recent use of antibiotics, foreign travel, consumption of bad foods, sick contacts.  Will give a liter of normal saline IV and Protonix.  EKG shows a rate of 100 and regular    Allergies:  Allergies   Allergen Reactions     Permethrin      rash       Problem List:    There are no active problems to display for this patient.       Past Medical History:    No past medical history on file.    Past Surgical History:    Past Surgical History:   Procedure Laterality Date     APPENDECTOMY       BIOPSY OF SKIN LESION         Family History:    No family history on file.    Social History:  Marital Status:  Single [1]  Social History     Tobacco Use     Smoking status: Never Smoker     Smokeless tobacco: Never Used   Substance Use Topics     Alcohol use: Yes     Comment: \"one yomi's hard lemonade on Sundays\"     Drug use: No        Medications:    naproxen sodium (ALEVE) 220 MG capsule          Review of Systems   Constitutional: Negative.  Negative for fever.   HENT: Negative.  Negative for congestion.    Eyes: Negative.  Negative for redness.   Respiratory: Negative.  Negative for shortness of breath.    Cardiovascular: Negative.  Negative for chest pain.   Gastrointestinal: Positive for blood in stool and diarrhea. Negative for abdominal pain.   Endocrine: Negative.    Genitourinary: Negative.  Negative for difficulty urinating.   Musculoskeletal: Negative.  Negative for arthralgias and neck " stiffness.   Skin: Negative.  Negative for color change.   Allergic/Immunologic: Negative.    Neurological: Negative.  Negative for headaches.   Hematological: Negative.    Psychiatric/Behavioral: Negative.  Negative for confusion.   All other systems reviewed and are negative.      Physical Exam   BP: 126/74  Pulse: 96  Temp: 97.6  F (36.4  C)  Resp: 17  Weight: 64.1 kg (141 lb 4.8 oz)  SpO2: 98 %      Physical Exam  Vitals signs and nursing note reviewed.   Constitutional:       General: He is not in acute distress.     Appearance: He is well-developed and normal weight. He is not ill-appearing, toxic-appearing or diaphoretic.   HENT:      Head: Normocephalic and atraumatic.      Mouth/Throat:      Mouth: Mucous membranes are moist.      Pharynx: Oropharynx is clear. No pharyngeal swelling or oropharyngeal exudate.   Eyes:      Extraocular Movements: Extraocular movements intact.      Pupils: Pupils are equal, round, and reactive to light.   Cardiovascular:      Rate and Rhythm: Normal rate and regular rhythm.      Heart sounds: No murmur. No gallop.    Pulmonary:      Effort: Pulmonary effort is normal. No respiratory distress.      Breath sounds: Normal breath sounds. No stridor. No wheezing, rhonchi or rales.   Chest:      Chest wall: No tenderness.   Abdominal:      General: Abdomen is flat. Bowel sounds are normal. There is no distension. There are no signs of injury.      Palpations: Abdomen is soft.      Tenderness: There is no tenderness. There is no right CVA tenderness, left CVA tenderness, guarding or rebound.   Genitourinary:     Prostate: Not enlarged and not tender.      Rectum: Normal. No mass or tenderness.   Skin:     General: Skin is dry.      Capillary Refill: Capillary refill takes less than 2 seconds.      Coloration: Skin is not cyanotic, jaundiced, mottled or pale.      Findings: No erythema or rash.   Neurological:      Mental Status: He is alert and oriented to person, place, and time.    Psychiatric:         Mood and Affect: Mood normal. Mood is not anxious or depressed.         Behavior: Behavior normal.         ED Course        Procedures               Critical Care time:  none               Results for orders placed or performed during the hospital encounter of 10/20/19 (from the past 24 hour(s))   Occult blood fecal HGB immuno   Result Value Ref Range    Occult Blood HGB FIT Negative NEG^Negative   CBC with platelets differential   Result Value Ref Range    WBC 7.2 4.0 - 11.0 10e9/L    RBC Count 5.38 4.4 - 5.9 10e12/L    Hemoglobin 14.6 13.3 - 17.7 g/dL    Hematocrit 42.6 40.0 - 53.0 %    MCV 79 78 - 100 fl    MCH 27.1 26.5 - 33.0 pg    MCHC 34.3 31.5 - 36.5 g/dL    RDW 11.8 10.0 - 15.0 %    Platelet Count 232 150 - 450 10e9/L    Diff Method Automated Method     % Neutrophils 66.8 %    % Lymphocytes 23.1 %    % Monocytes 8.4 %    % Eosinophils 1.1 %    % Basophils 0.3 %    % Immature Granulocytes 0.3 %    Nucleated RBCs 0 0 /100    Absolute Neutrophil 4.8 1.6 - 8.3 10e9/L    Absolute Lymphocytes 1.7 0.8 - 5.3 10e9/L    Absolute Monocytes 0.6 0.0 - 1.3 10e9/L    Absolute Eosinophils 0.1 0.0 - 0.7 10e9/L    Absolute Basophils 0.0 0.0 - 0.2 10e9/L    Abs Immature Granulocytes 0.0 0 - 0.4 10e9/L    Absolute Nucleated RBC 0.0    INR   Result Value Ref Range    INR 1.02 0.80 - 1.20   Comprehensive metabolic panel   Result Value Ref Range    Sodium 139 133 - 144 mmol/L    Potassium 3.4 3.4 - 5.3 mmol/L    Chloride 108 94 - 109 mmol/L    Carbon Dioxide 27 20 - 32 mmol/L    Anion Gap 4 3 - 14 mmol/L    Glucose 97 70 - 99 mg/dL    Urea Nitrogen 17 7 - 30 mg/dL    Creatinine 0.71 0.66 - 1.25 mg/dL    GFR Estimate >90 >60 mL/min/[1.73_m2]    GFR Estimate If Black >90 >60 mL/min/[1.73_m2]    Calcium 10.2 (H) 8.5 - 10.1 mg/dL    Bilirubin Total 0.7 0.2 - 1.3 mg/dL    Albumin 4.0 3.4 - 5.0 g/dL    Protein Total 7.1 6.8 - 8.8 g/dL    Alkaline Phosphatase 81 40 - 150 U/L    ALT 50 0 - 70 U/L    AST 20 0 - 45  U/L   Lactic acid whole blood   Result Value Ref Range    Lactic Acid 1.7 0.7 - 2.0 mmol/L   Lipase   Result Value Ref Range    Lipase 86 73 - 393 U/L   CRP inflammation   Result Value Ref Range    CRP Inflammation 6.2 0.0 - 8.0 mg/L   Urine Drugs of Abuse Screen Panel 13   Result Value Ref Range    Cannabinoids (25-awf-8-carboxy-9-THC) Not Detected NDET^Not Detected ng/mL    Phencyclidine (Phencyclidine) Not Detected NDET^Not Detected ng/mL    Cocaine (Benzoylecgonine) Not Detected NDET^Not Detected ng/mL    Methamphetamine (d-Methamphetamine) Not Detected NDET^Not Detected ng/mL    Opiates (Morphine) Not Detected NDET^Not Detected ng/mL    Amphetamine (d-Amphetamine) Not Detected NDET^Not Detected ng/mL    Benzodiazepines (Nordiazepam) Not Detected NDET^Not Detected ng/mL    Tricyclic Antidepressants (Desipramine) Not Detected NDET^Not Detected ng/mL    Methadone (Methadone) Not Detected NDET^Not Detected ng/mL    Barbiturates (Butalbital) Not Detected NDET^Not Detected ng/mL    Oxycodone (Oxycodone) Not Detected NDET^Not Detected ng/mL    Propoxyphene (Norpropoxyphene) Not Detected NDET^Not Detected ng/mL    Buprenorphine (Buprenorphine) Not Detected NDET^Not Detected ng/mL   UA with Microscopic reflex to Culture   Result Value Ref Range    Color Urine Light Yellow     Appearance Urine Clear     Glucose Urine Negative NEG^Negative mg/dL    Bilirubin Urine Negative NEG^Negative    Ketones Urine Negative NEG^Negative mg/dL    Specific Gravity Urine 1.019 1.003 - 1.035    Blood Urine Negative NEG^Negative    pH Urine 8.0 4.7 - 8.0 pH    Protein Albumin Urine Negative NEG^Negative mg/dL    Urobilinogen mg/dL Normal 0.0 - 2.0 mg/dL    Nitrite Urine Negative NEG^Negative    Leukocyte Esterase Urine Negative NEG^Negative    Source Unspecified Urine     WBC Urine 1 0 - 5 /HPF    RBC Urine 0 0 - 2 /HPF    Bacteria Urine None (A) NEG^Negative /HPF    Mucous Urine Present (A) NEG^Negative /LPF   CT Abdomen Pelvis w Contrast     Narrative    CT ABDOMEN PELVIS W CONTRAST    CLINICAL HISTORY: Male, age 29 years,  Abd pain, diverticulitis  suspected;    Comparison:  None.    TECHNIQUE:  CT was performed of the abdomen and pelvis with IV  contrast. Sagittal, coronal and axial reconstructions were reviewed.     FINDINGS:    Abdomen/Pelvis CT:  Lung Bases:  Clear    Esophagus/stomach: Normal.    Liver:  Normal.  Portal venous system: Patent.  Gallbladder: Normal.    Spleen: Normal.    Pancreas: Normal.    Adrenal Glands: Normal.    Kidneys: Normal.  Ureters: Normal.  Urinary bladder: Small volume of intraluminal gas is seen.    Abdominal Aorta: Normal  IVC: Normal.    Lymph Nodes: Normal.    Large and Small Bowel: Normal.  Appendix: Not seen. No secondary signs of appendicitis.    Pelvic Organs:  Normal.  Peritoneum: Normal.  Bony structures: No acute bony abnormality. 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    Other Findings:  Inguinal lymph nodes are normal.      Impression    IMPRESSION:   Low dense focus within the urinary bladder suggesting tiny volume of  intraluminal gas. This finding is typically associated with recent  urinary bladder catheterization. If the patient has not been  catheterized recently, the finding is suggestive of emphysematous  cystitis.    SAHIL PINTO MD       Medications   0.9% sodium chloride BOLUS (1,000 mLs Intravenous New Bag 10/20/19 1531)     Followed by   sodium chloride 0.9% infusion (has no administration in time range)   pantoprazole (PROTONIX) 40 mg IV push injection (40 mg Intravenous Given 10/20/19 1531)   iopamidol (ISOVUE-300) IV solution 61% 100 mL (100 mLs Intravenous Given 10/20/19 1547)   sodium chloride (PF) 0.9% PF flush 60 mL (50 mLs Intravenous Given 10/20/19 1547)       Assessments & Plan (with Medical Decision Making)     I have reviewed the nursing notes.    I have reviewed the findings,  diagnosis, plan and need for follow up with the patient.   Not presenting as an acute surgical abdomen.  The patient is feeling better and really has not had any episodes of loose stools since his been in the emergency room.  He is medically stable and can be discharged.  He is to follow-up with his primary care provider but will also refer him to Dr. Vargas, general surgeon, for further evaluation.  Perhaps endoscopy is necessary.  If he provide stool specimens culturing can be obtained as well.  Return as necessary.    New Prescriptions    No medications on file       Final diagnoses:   Diarrhea, unspecified type       10/20/2019   [facility]     Atilio Kuo,   10/20/19 2872

## 2019-10-24 ENCOUNTER — OFFICE VISIT (OUTPATIENT)
Dept: SURGERY | Facility: OTHER | Age: 29
End: 2019-10-24
Attending: FAMILY MEDICINE
Payer: COMMERCIAL

## 2019-10-24 VITALS
BODY MASS INDEX: 26.5 KG/M2 | DIASTOLIC BLOOD PRESSURE: 78 MMHG | HEIGHT: 62 IN | HEART RATE: 97 BPM | TEMPERATURE: 97 F | OXYGEN SATURATION: 99 % | WEIGHT: 144 LBS | SYSTOLIC BLOOD PRESSURE: 122 MMHG

## 2019-10-24 DIAGNOSIS — R19.7 DIARRHEA, UNSPECIFIED TYPE: Primary | ICD-10-CM

## 2019-10-24 PROCEDURE — 99203 OFFICE O/P NEW LOW 30 MIN: CPT | Performed by: SURGERY

## 2019-10-24 PROCEDURE — G0463 HOSPITAL OUTPT CLINIC VISIT: HCPCS

## 2019-10-24 ASSESSMENT — MIFFLIN-ST. JEOR: SCORE: 1507.43

## 2019-10-24 ASSESSMENT — PAIN SCALES - GENERAL: PAINLEVEL: NO PAIN (0)

## 2019-10-24 NOTE — PATIENT INSTRUCTIONS
Thank you for allowing Dr. Castillo and our surgical team to participate in your care. Please call our health unit coordinator at [phone] with scheduling questions or the nurse at [phone] with any other questions or concerns.    Labs today. Our office will contact you by phone or my-chart with test results.

## 2019-10-24 NOTE — PROGRESS NOTES
"[facility] Surgery Consultation    CC:  Diarrhea    HPI:   This 29 year old year old male is seen at the request of Dr. Kuo for evaluation of diarrhea.  The history is obtained from the patient, and reviewing the medical record.  He is good medical historian. Mr. Marte states that this past weekend he developed acute onset of diarrhea. He had multiple loose bowel movements with crampy abdominal pain. After the bowel movements he started to have bloody stools. While in the ER he had labs and imaging which were done. There were no intra-abdominal lesions noted on his CT scan and his labs were normal. He was then referred for further evaluation. He says that he continues to have issues with loose stools. They are not pure liquid. He has had no further bloody stools. He continues to have abdominal cramping and lower abdominal pain. He has had no fevers or chills. He says his cramping will resolve after he has a bowel movement. He had tried dietary changes and says that he will have loose stools after eating. He has no family or personal history of Crohns disease, ulcerative colitis. He has never undergone a colonoscopy. He has not done any stool studies.    History reviewed. No pertinent past medical history.    Past Surgical History:   Procedure Laterality Date     APPENDECTOMY       BIOPSY OF SKIN LESION         History reviewed. No pertinent family history.    Social History     Tobacco Use     Smoking status: Never Smoker     Smokeless tobacco: Never Used   Substance Use Topics     Alcohol use: Yes     Comment: \"one yomi's hard lemonade on Sundays\"     Drug use: No       Prior to Admission medications    Medication Sig Start Date End Date Taking? Authorizing Provider   naproxen sodium (ALEVE) 220 MG capsule Take 220 mg by mouth 2 times daily as needed   Yes Reported, Patient       Pt denied problems with bleeding or anesthesia  No mood altering drug use.       Allergies   Allergen Reactions     Permethrin  " "    rash       REVIEW OF SYSTEMS:  Ten point review of systems negative except those mentioned in the HPI.     The patient denies sleep apnea, latex allergies or MRSA    OBJECTIVE:    /78 (BP Location: Right arm, Patient Position: Chair, Cuff Size: Adult Regular)   Pulse 97   Temp 97  F (36.1  C) (Tympanic)   Ht 1.575 m (5' 2\")   Wt 65.3 kg (144 lb)   SpO2 99%   BMI 26.34 kg/m      GENERAL: Generally appears well, in no distress with appropriate affect.  HEENT:   Sclerae anicteric - No cervical, supra/infraclavicular lymphadenopathy  Respiratory:  Lungs clear to ausculation bilaterally with good air excursion  Cardiovascular:  Regular Rate and Rhythm with no murmurs gallops or rubs, normal   Abdomen: soft, tender to palpation bilateral lower quadrants with no rebound or guarding  :  deferred  Extremities:  Extremities normal. No deformities, edema, or skin discoloration.  Skin:  no suspicious lesions or rashes  Neurological: grossly intact  Psych:  Alert, oriented, affect appropriate with normal decision making ability.      IMPRESSION:  30 yo male with diarrhea of unknown origin    PLAN:  At this time we will proceed with stool studies to evaluate for any potential etiology of his diarrhea. When those results are performed I will evaluate them and determine if colonoscopy needs to be performed urgently or if treatment of some colitis needs to be done then a colonoscopy. This was discussed with the patient who understands and agrees. All questions and concerns were addressed with adequate time spent answering all concerns.        Thank you for allowing me to participate in the care of your patient.       Samuel Castillo MD     10/24/2019  11:00 AM    cc:  Ching Brown    "

## 2020-03-02 ENCOUNTER — HEALTH MAINTENANCE LETTER (OUTPATIENT)
Age: 30
End: 2020-03-02

## 2020-03-09 ENCOUNTER — HOSPITAL ENCOUNTER (EMERGENCY)
Facility: HOSPITAL | Age: 30
Discharge: HOME | End: 2020-03-09
Attending: INTERNAL MEDICINE | Admitting: INTERNAL MEDICINE
Payer: OTHER MISCELLANEOUS

## 2020-03-09 ENCOUNTER — APPOINTMENT (OUTPATIENT)
Dept: GENERAL RADIOLOGY | Facility: HOSPITAL | Age: 30
End: 2020-03-09
Attending: INTERNAL MEDICINE
Payer: OTHER MISCELLANEOUS

## 2020-03-09 VITALS
TEMPERATURE: 96.1 F | SYSTOLIC BLOOD PRESSURE: 99 MMHG | DIASTOLIC BLOOD PRESSURE: 54 MMHG | OXYGEN SATURATION: 99 % | RESPIRATION RATE: 16 BRPM

## 2020-03-09 DIAGNOSIS — R07.89 CHEST WALL PAIN: ICD-10-CM

## 2020-03-09 PROCEDURE — 71101 X-RAY EXAM UNILAT RIBS/CHEST: CPT | Mod: TC,LT

## 2020-03-09 PROCEDURE — 99283 EMERGENCY DEPT VISIT LOW MDM: CPT

## 2020-03-09 PROCEDURE — 99283 EMERGENCY DEPT VISIT LOW MDM: CPT | Mod: Z6 | Performed by: INTERNAL MEDICINE

## 2020-03-09 RX ORDER — IBUPROFEN 600 MG/1
600 TABLET, FILM COATED ORAL ONCE
Status: DISCONTINUED | OUTPATIENT
Start: 2020-03-09 | End: 2020-03-09 | Stop reason: HOSPADM

## 2020-03-09 NOTE — ED AVS SNAPSHOT
[facility]  [address]  [place] [address]  Phone:  [phone]                                    Ryan Marte   MRN: [id number]    Department:  [facility]   Date of Visit:  3/9/2020           After Visit Summary Signature Page    I have received my discharge instructions, and my questions have been answered. I have discussed any challenges I see with this plan with the nurse or doctor.    ..........................................................................................................................................  Patient/Patient Representative Signature      ..........................................................................................................................................  Patient Representative Print Name and Relationship to Patient    ..................................................               ................................................  Date                                   Time    ..........................................................................................................................................  Reviewed by Signature/Title    ...................................................              ..............................................  Date                                               Time          22EPIC Rev 08/18

## 2020-03-09 NOTE — ED TRIAGE NOTES
Was leaning into a garbage can at work and thinks he has a rib out on the left side. Pt stated he sneezed before and popped 5 out.  No distress noted or reported

## 2020-03-09 NOTE — ED NOTES
Discharge papers given to pt who verbalizes understanding of discharge dx, follow up PRN with PCP.  Pt taking own IBU at home.  MD aware. Pt discharged to home with steady gait.

## 2020-03-11 ASSESSMENT — ENCOUNTER SYMPTOMS
NAUSEA: 0
ABDOMINAL DISTENTION: 0
ANAL BLEEDING: 0
VOMITING: 0
COLOR CHANGE: 0
FREQUENCY: 0
DIZZINESS: 0
NECK PAIN: 0
NUMBNESS: 0
DIAPHORESIS: 0
ABDOMINAL PAIN: 0
CONFUSION: 0
COUGH: 0
BLOOD IN STOOL: 0
BACK PAIN: 0
SLEEP DISTURBANCE: 0
CHILLS: 0
FEVER: 0
MYALGIAS: 0
PALPITATIONS: 0
VOICE CHANGE: 0
HEADACHES: 0
LIGHT-HEADEDNESS: 0
WHEEZING: 0
CHEST TIGHTNESS: 0
FLANK PAIN: 0
DYSURIA: 0
SHORTNESS OF BREATH: 0
WEAKNESS: 0

## 2020-03-11 NOTE — ED PROVIDER NOTES
"  History     Chief Complaint   Patient presents with     Rib Pain     The history is provided by the patient.   Chest Pain   Pain location:  L lateral chest  Pain quality: sharp    Pain radiates to:  Does not radiate  Pain severity:  Moderate  Onset quality:  Sudden  Timing:  Constant  Chronicity:  New  Context: breathing    Associated symptoms: no abdominal pain, no back pain, no cough, no diaphoresis, no dizziness, no fever, no headache, no nausea, no numbness, no palpitations, no shortness of breath, no vomiting and no weakness          Allergies:  Allergies   Allergen Reactions     Permethrin      rash       Problem List:    There are no active problems to display for this patient.       Past Medical History:    No past medical history on file.    Past Surgical History:    Past Surgical History:   Procedure Laterality Date     APPENDECTOMY       BIOPSY OF SKIN LESION         Family History:    No family history on file.    Social History:  Marital Status:  Single [1]  Social History     Tobacco Use     Smoking status: Never Smoker     Smokeless tobacco: Never Used   Substance Use Topics     Alcohol use: Yes     Comment: \"one yomi's hard lemonade on Sundays\"     Drug use: No        Medications:    naproxen sodium (ALEVE) 220 MG capsule          Review of Systems   Constitutional: Negative for chills, diaphoresis and fever.   HENT: Negative for voice change.    Eyes: Negative for visual disturbance.   Respiratory: Negative for cough, chest tightness, shortness of breath and wheezing.    Cardiovascular: Positive for chest pain. Negative for palpitations and leg swelling.   Gastrointestinal: Negative for abdominal distention, abdominal pain, anal bleeding, blood in stool, nausea and vomiting.   Genitourinary: Negative for decreased urine volume, dysuria, flank pain and frequency.   Musculoskeletal: Negative for back pain, gait problem, myalgias and neck pain.   Skin: Negative for color change, pallor and rash. "   Neurological: Negative for dizziness, syncope, weakness, light-headedness, numbness and headaches.   Psychiatric/Behavioral: Negative for confusion, sleep disturbance and suicidal ideas.       Physical Exam   BP: 99/54  Heart Rate: 85  Temp: 96.1  F (35.6  C)  Resp: 16  SpO2: 99 %      Physical Exam  Vitals signs and nursing note reviewed.   Constitutional:       Appearance: He is well-developed.   HENT:      Head: Normocephalic and atraumatic.   Eyes:      Conjunctiva/sclera: Conjunctivae normal.      Pupils: Pupils are equal, round, and reactive to light.   Neck:      Musculoskeletal: Normal range of motion and neck supple.      Thyroid: No thyromegaly.      Vascular: No JVD.      Trachea: No tracheal deviation.   Cardiovascular:      Rate and Rhythm: Normal rate and regular rhythm.      Heart sounds: Normal heart sounds. No murmur. No gallop.    Pulmonary:      Effort: Pulmonary effort is normal. No respiratory distress.      Breath sounds: Normal breath sounds. No stridor. No wheezing or rales.   Chest:      Chest wall: Tenderness present.       Abdominal:      General: Bowel sounds are normal. There is no distension.      Palpations: Abdomen is soft. There is no mass.      Tenderness: There is no abdominal tenderness. There is no guarding or rebound.   Musculoskeletal: Normal range of motion.         General: No tenderness.   Lymphadenopathy:      Cervical: No cervical adenopathy.   Skin:     General: Skin is warm.      Coloration: Skin is not pale.      Findings: No erythema or rash.   Neurological:      Mental Status: He is alert and oriented to person, place, and time.   Psychiatric:         Behavior: Behavior normal.         ED Course        Procedures                   No results found for this or any previous visit (from the past 24 hour(s)).    Medications - No data to display    Assessments & Plan (with Medical Decision Making)   Left ladteral chest wall pain with point tenderness  Symptom started  when pt bent toward trash can and felt a pop in his chest wall  Xray ribs negative  NSAIDS for pain control  Follow-up with PCP  I have reviewed the nursing notes.    I have reviewed the findings, diagnosis, plan and need for follow up with the patient.      Discharge Medication List as of 3/9/2020  7:47 AM          Final diagnoses:   Chest wall pain       3/9/2020   [facility]     Shree Mae MD  03/11/20 0757

## 2020-10-27 ENCOUNTER — RESULTS ONLY (OUTPATIENT)
Dept: LAB | Age: 30
End: 2020-10-27

## 2020-10-27 LAB — TROPONIN I SERPL-MCNC: <0.015 UG/L (ref 0–0.04)

## 2020-12-14 ENCOUNTER — HEALTH MAINTENANCE LETTER (OUTPATIENT)
Age: 30
End: 2020-12-14

## 2021-02-22 ENCOUNTER — E-VISIT (OUTPATIENT)
Dept: PEDIATRICS | Facility: CLINIC | Age: 31
End: 2021-02-22

## 2021-02-22 DIAGNOSIS — R06.02 SOB (SHORTNESS OF BREATH): Primary | ICD-10-CM

## 2021-02-22 PROCEDURE — 99207 PR NO BILLABLE SERVICE THIS VISIT: CPT | Performed by: PREVENTIVE MEDICINE

## 2021-02-22 NOTE — PATIENT INSTRUCTIONS
Dear Ryan Marte,    We are sorry you are not feeling well. Based on the responses you provided, it is recommended that you be seen in-person in urgent care so we can better evaluate your symptoms. Please click here to find the nearest urgent care location to you.   You will not be charged for this Visit. Thank you for trusting us with your care.    Yaw Pelletier MD, MD    Dear Ryan Marte,    We are sorry you are not feeling well. Based on the responses you provided, it is recommended that you be seen in-person in urgent care so we can better evaluate your symptoms. Please click here to find the nearest urgent care location to you.   You will not be charged for this Visit. Thank you for trusting us with your care.    Yaw Pelletier MD, MD

## 2021-04-10 ENCOUNTER — APPOINTMENT (OUTPATIENT)
Dept: GENERAL RADIOLOGY | Facility: HOSPITAL | Age: 31
End: 2021-04-10
Attending: INTERNAL MEDICINE
Payer: OTHER MISCELLANEOUS

## 2021-04-10 ENCOUNTER — HOSPITAL ENCOUNTER (EMERGENCY)
Facility: HOSPITAL | Age: 31
Discharge: HOME | End: 2021-04-10
Attending: INTERNAL MEDICINE | Admitting: INTERNAL MEDICINE
Payer: OTHER MISCELLANEOUS

## 2021-04-10 VITALS
TEMPERATURE: 96.8 F | DIASTOLIC BLOOD PRESSURE: 74 MMHG | HEART RATE: 87 BPM | OXYGEN SATURATION: 98 % | RESPIRATION RATE: 20 BRPM | SYSTOLIC BLOOD PRESSURE: 104 MMHG

## 2021-04-10 DIAGNOSIS — R07.89 CHEST WALL PAIN: ICD-10-CM

## 2021-04-10 DIAGNOSIS — R07.81 RIB PAIN ON LEFT SIDE: ICD-10-CM

## 2021-04-10 PROCEDURE — 250N000013 HC RX MED GY IP 250 OP 250 PS 637: Performed by: INTERNAL MEDICINE

## 2021-04-10 PROCEDURE — 99284 EMERGENCY DEPT VISIT MOD MDM: CPT | Performed by: INTERNAL MEDICINE

## 2021-04-10 PROCEDURE — 99283 EMERGENCY DEPT VISIT LOW MDM: CPT

## 2021-04-10 PROCEDURE — 71101 X-RAY EXAM UNILAT RIBS/CHEST: CPT | Mod: LT

## 2021-04-10 RX ORDER — NAPROXEN 500 MG/1
500 TABLET ORAL ONCE
Status: COMPLETED | OUTPATIENT
Start: 2021-04-10 | End: 2021-04-10

## 2021-04-10 RX ORDER — CYCLOBENZAPRINE HCL 10 MG
10 TABLET ORAL 2 TIMES DAILY PRN
Qty: 10 TABLET | Refills: 0 | Status: SHIPPED | OUTPATIENT
Start: 2021-04-10 | End: 2021-04-15

## 2021-04-10 RX ADMIN — NAPROXEN 500 MG: 500 TABLET ORAL at 07:36

## 2021-04-10 NOTE — ED NOTES
"\"Here for left mid back pain and left side pain after assisting resident back to bed from bedside commode.\"   "

## 2021-04-10 NOTE — ED NOTES
All discharge instruction and avs discussed with patient.  All questions answered.  rx sent home with patient.  Work note and copy of work related injury discharge form sent home with patient.  All belongings carl landeros.  Pt able to verbalize home care, follow up and medication management.

## 2021-04-10 NOTE — Clinical Note
Ryan Marte was seen and treated in our emergency department on 4/10/2021.  He may return to work on 04/12/2021.       If you have any questions or concerns, please don't hesitate to call.      Shree Mae MD

## 2021-04-15 ASSESSMENT — ENCOUNTER SYMPTOMS
HEADACHES: 0
ABDOMINAL PAIN: 0
ARTHRALGIAS: 0
DIFFICULTY URINATING: 0
FEVER: 0
COLOR CHANGE: 0
EYE REDNESS: 0
NECK STIFFNESS: 0
CONFUSION: 0
SHORTNESS OF BREATH: 0

## 2021-04-15 NOTE — ED PROVIDER NOTES
"  History     Chief Complaint   Patient presents with     Back Pain     Lateral and posterior left side pain- sharp     The history is provided by the patient.   Chest Pain  Pain location:  L chest and L lateral chest  Pain quality: sharp    Pain radiates to:  Does not radiate  Pain severity:  Moderate  Onset quality:  Sudden  Timing:  Constant  Chronicity:  Recurrent  Context: breathing    Associated symptoms: no abdominal pain, no fever, no headache and no shortness of breath          Allergies:  Allergies   Allergen Reactions     Permethrin      rash       Problem List:    There are no active problems to display for this patient.       Past Medical History:    No past medical history on file.    Past Surgical History:    Past Surgical History:   Procedure Laterality Date     APPENDECTOMY       BIOPSY OF SKIN LESION         Family History:    No family history on file.    Social History:  Marital Status:  Single [1]  Social History     Tobacco Use     Smoking status: Never Smoker     Smokeless tobacco: Never Used   Substance Use Topics     Alcohol use: Yes     Comment: \"one yomi's hard lemonade on Sundays\"     Drug use: No        Medications:    cyclobenzaprine (FLEXERIL) 10 MG tablet  Acetaminophen (TYLENOL PO)  naproxen sodium (ALEVE) 220 MG capsule          Review of Systems   Constitutional: Negative for fever.   HENT: Negative for congestion.    Eyes: Negative for redness.   Respiratory: Negative for shortness of breath.    Cardiovascular: Positive for chest pain.   Gastrointestinal: Negative for abdominal pain.   Genitourinary: Negative for difficulty urinating.   Musculoskeletal: Negative for arthralgias and neck stiffness.   Skin: Negative for color change.   Neurological: Negative for headaches.   Psychiatric/Behavioral: Negative for confusion.   All other systems reviewed and are negative.      Physical Exam   BP: 104/74  Pulse: 87  Temp: 96.8  F (36  C)  Resp: 20  SpO2: 98 %      Physical " Exam  Constitutional:       General: He is not in acute distress.     Appearance: He is not diaphoretic.   HENT:      Head: Atraumatic.   Eyes:      General: No scleral icterus.     Pupils: Pupils are equal, round, and reactive to light.   Cardiovascular:      Heart sounds: Normal heart sounds.   Pulmonary:      Effort: No respiratory distress.      Breath sounds: Normal breath sounds.   Chest:      Chest wall: Tenderness present.          Comments: Left lower lateral chest wall tenderness in multiple ribs  Abdominal:      General: Bowel sounds are normal.      Palpations: Abdomen is soft.      Tenderness: There is no abdominal tenderness.   Musculoskeletal:         General: No tenderness.   Skin:     General: Skin is warm.      Findings: No rash.         ED Course        Procedures                 No results found for this or any previous visit (from the past 24 hour(s)).    Medications   naproxen (NAPROSYN) tablet 500 mg (500 mg Oral Given 4/10/21 0736)       Assessments & Plan (with Medical Decision Making)   Left lower chest wall pain after twisted his body while in doing his job in [place]  Worried that might dislocated his ribs  Rib xray negative  NSAIDs , mucsle relaxant for pain control  Follow-up with PCP  I have reviewed the nursing notes.    I have reviewed the findings, diagnosis, plan and need for follow up with the patient.      Discharge Medication List as of 4/10/2021  8:16 AM      START taking these medications    Details   cyclobenzaprine (FLEXERIL) 10 MG tablet Take 1 tablet (10 mg) by mouth 2 times daily as needed for muscle spasms, Disp-10 tablet, R-0, Local Print             Final diagnoses:   Rib pain on left side   Chest wall pain       4/10/2021   [facility]     Shree Mae MD  04/15/21 1378

## 2021-04-18 ENCOUNTER — HEALTH MAINTENANCE LETTER (OUTPATIENT)
Age: 31
End: 2021-04-18

## 2021-09-16 DIAGNOSIS — R06.00 DYSPNEA: Primary | ICD-10-CM

## 2021-10-03 ENCOUNTER — HEALTH MAINTENANCE LETTER (OUTPATIENT)
Age: 31
End: 2021-10-03

## 2021-10-26 ENCOUNTER — HOSPITAL ENCOUNTER (OUTPATIENT)
Dept: RESPIRATORY THERAPY | Facility: HOSPITAL | Age: 31
Discharge: HOME | End: 2021-10-26
Attending: NURSE PRACTITIONER | Admitting: INTERNAL MEDICINE
Payer: COMMERCIAL

## 2021-10-26 DIAGNOSIS — R06.00 DYSPNEA: ICD-10-CM

## 2021-10-26 LAB — HGB BLD-MCNC: 15.3 G/DL (ref 13.3–17.7)

## 2021-10-26 PROCEDURE — 94010 BREATHING CAPACITY TEST: CPT

## 2021-10-26 PROCEDURE — 94726 PLETHYSMOGRAPHY LUNG VOLUMES: CPT | Mod: 26 | Performed by: INTERNAL MEDICINE

## 2021-10-26 PROCEDURE — 94729 DIFFUSING CAPACITY: CPT | Mod: 26 | Performed by: INTERNAL MEDICINE

## 2021-10-26 PROCEDURE — 85018 HEMOGLOBIN: CPT | Performed by: INTERNAL MEDICINE

## 2021-10-26 PROCEDURE — 36415 COLL VENOUS BLD VENIPUNCTURE: CPT | Performed by: INTERNAL MEDICINE

## 2021-10-26 PROCEDURE — 94729 DIFFUSING CAPACITY: CPT

## 2021-10-26 PROCEDURE — 94726 PLETHYSMOGRAPHY LUNG VOLUMES: CPT

## 2021-10-26 PROCEDURE — 94010 BREATHING CAPACITY TEST: CPT | Mod: 26 | Performed by: INTERNAL MEDICINE

## 2022-05-14 ENCOUNTER — HEALTH MAINTENANCE LETTER (OUTPATIENT)
Age: 32
End: 2022-05-14

## 2022-09-04 ENCOUNTER — HEALTH MAINTENANCE LETTER (OUTPATIENT)
Age: 32
End: 2022-09-04

## 2023-03-08 NOTE — NURSING NOTE
"Chief Complaint   Patient presents with     Consult     ER consult for abdominal pain and diarrhea/blood per rectum- started last sunday.  The abdominal pain comes and goes.  Ching Brown is primary.         Initial /78 (BP Location: Right arm, Patient Position: Chair, Cuff Size: Adult Regular)   Pulse 97   Temp 97  F (36.1  C) (Tympanic)   Ht 1.575 m (5' 2\")   Wt 65.3 kg (144 lb)   SpO2 99%   BMI 26.34 kg/m   Estimated body mass index is 26.34 kg/m  as calculated from the following:    Height as of this encounter: 1.575 m (5' 2\").    Weight as of this encounter: 65.3 kg (144 lb).  Medication Reconciliation: complete  TAMEKA WOODWARD LPN    "
[FreeTextEntry1] : Bone mineral density March 8, 2023\par Spine not accurate due to scoliosis\par  total hip -2.9 osteoporosis prior report -2.7\par Femoral neck -3.3 osteoporosis prior report -3.2\par Proximal radius -2.0 osteopenia no prior report\par

## 2023-06-03 ENCOUNTER — HEALTH MAINTENANCE LETTER (OUTPATIENT)
Age: 33
End: 2023-06-03

## 2023-08-02 ENCOUNTER — HOSPITAL ENCOUNTER (EMERGENCY)
Facility: HOSPITAL | Age: 33
Discharge: HOME | End: 2023-08-02
Attending: INTERNAL MEDICINE | Admitting: INTERNAL MEDICINE
Payer: COMMERCIAL

## 2023-08-02 VITALS
TEMPERATURE: 99.5 F | OXYGEN SATURATION: 98 % | HEART RATE: 92 BPM | SYSTOLIC BLOOD PRESSURE: 113 MMHG | RESPIRATION RATE: 16 BRPM | DIASTOLIC BLOOD PRESSURE: 88 MMHG

## 2023-08-02 DIAGNOSIS — R00.0 SINUS TACHYCARDIA: ICD-10-CM

## 2023-08-02 LAB
ALBUMIN SERPL BCG-MCNC: 4.3 G/DL (ref 3.5–5.2)
ALBUMIN UR-MCNC: NEGATIVE MG/DL
ALP SERPL-CCNC: 86 U/L (ref 40–129)
ALT SERPL W P-5'-P-CCNC: 41 U/L (ref 0–70)
AMPHETAMINES UR QL: NOT DETECTED
ANION GAP SERPL CALCULATED.3IONS-SCNC: 14 MMOL/L (ref 7–15)
APPEARANCE UR: CLEAR
AST SERPL W P-5'-P-CCNC: 19 U/L (ref 0–45)
BARBITURATES UR QL SCN: NOT DETECTED
BASOPHILS # BLD AUTO: 0 10E3/UL (ref 0–0.2)
BASOPHILS NFR BLD AUTO: 1 %
BENZODIAZ UR QL SCN: NOT DETECTED
BILIRUB SERPL-MCNC: 0.6 MG/DL
BILIRUB UR QL STRIP: NEGATIVE
BUN SERPL-MCNC: 13.3 MG/DL (ref 6–20)
BUPRENORPHINE UR QL: NOT DETECTED
CALCIUM SERPL-MCNC: 10.8 MG/DL (ref 8.6–10)
CANNABINOIDS UR QL: NOT DETECTED
CHLORIDE SERPL-SCNC: 102 MMOL/L (ref 98–107)
COCAINE UR QL SCN: NOT DETECTED
COLOR UR AUTO: NORMAL
CREAT SERPL-MCNC: 0.81 MG/DL (ref 0.67–1.17)
CRP SERPL-MCNC: 10.3 MG/L
D-METHAMPHET UR QL: NOT DETECTED
DEPRECATED HCO3 PLAS-SCNC: 22 MMOL/L (ref 22–29)
EOSINOPHIL # BLD AUTO: 0.2 10E3/UL (ref 0–0.7)
EOSINOPHIL NFR BLD AUTO: 2 %
ERYTHROCYTE [DISTWIDTH] IN BLOOD BY AUTOMATED COUNT: 11.8 % (ref 10–15)
ETHANOL SERPL-MCNC: <0.01 G/DL
GFR SERPL CREATININE-BSD FRML MDRD: >90 ML/MIN/1.73M2
GLUCOSE SERPL-MCNC: 95 MG/DL (ref 70–99)
GLUCOSE UR STRIP-MCNC: NEGATIVE MG/DL
HCT VFR BLD AUTO: 46.2 % (ref 40–53)
HGB BLD-MCNC: 16.1 G/DL (ref 13.3–17.7)
HGB UR QL STRIP: NEGATIVE
HOLD SPECIMEN: NORMAL
IMM GRANULOCYTES # BLD: 0 10E3/UL
IMM GRANULOCYTES NFR BLD: 0 %
KETONES UR STRIP-MCNC: NEGATIVE MG/DL
LEUKOCYTE ESTERASE UR QL STRIP: NEGATIVE
LYMPHOCYTES # BLD AUTO: 1.6 10E3/UL (ref 0.8–5.3)
LYMPHOCYTES NFR BLD AUTO: 22 %
MCH RBC QN AUTO: 27.4 PG (ref 26.5–33)
MCHC RBC AUTO-ENTMCNC: 34.8 G/DL (ref 31.5–36.5)
MCV RBC AUTO: 79 FL (ref 78–100)
METHADONE UR QL SCN: NOT DETECTED
MONOCYTES # BLD AUTO: 0.7 10E3/UL (ref 0–1.3)
MONOCYTES NFR BLD AUTO: 9 %
NEUTROPHILS # BLD AUTO: 4.8 10E3/UL (ref 1.6–8.3)
NEUTROPHILS NFR BLD AUTO: 66 %
NITRATE UR QL: NEGATIVE
NRBC # BLD AUTO: 0 10E3/UL
NRBC BLD AUTO-RTO: 0 /100
OPIATES UR QL SCN: NOT DETECTED
OXYCODONE UR QL SCN: NOT DETECTED
PCP UR QL SCN: NOT DETECTED
PH UR STRIP: 7.5 [PH] (ref 4.7–8)
PLATELET # BLD AUTO: 239 10E3/UL (ref 150–450)
POTASSIUM SERPL-SCNC: 3.9 MMOL/L (ref 3.4–5.3)
PROPOXYPH UR QL: NOT DETECTED
PROT SERPL-MCNC: 7.1 G/DL (ref 6.4–8.3)
RBC # BLD AUTO: 5.87 10E6/UL (ref 4.4–5.9)
SODIUM SERPL-SCNC: 138 MMOL/L (ref 136–145)
SP GR UR STRIP: 1.02 (ref 1–1.03)
TRICYCLICS UR QL SCN: NOT DETECTED
TSH SERPL DL<=0.005 MIU/L-ACNC: 2.11 UIU/ML (ref 0.3–4.2)
UROBILINOGEN UR STRIP-MCNC: NORMAL MG/DL
WBC # BLD AUTO: 7.4 10E3/UL (ref 4–11)

## 2023-08-02 PROCEDURE — 82077 ASSAY SPEC XCP UR&BREATH IA: CPT | Performed by: INTERNAL MEDICINE

## 2023-08-02 PROCEDURE — 86140 C-REACTIVE PROTEIN: CPT | Performed by: INTERNAL MEDICINE

## 2023-08-02 PROCEDURE — 93005 ELECTROCARDIOGRAM TRACING: CPT

## 2023-08-02 PROCEDURE — 80306 DRUG TEST PRSMV INSTRMNT: CPT | Performed by: INTERNAL MEDICINE

## 2023-08-02 PROCEDURE — 85025 COMPLETE CBC W/AUTO DIFF WBC: CPT | Performed by: INTERNAL MEDICINE

## 2023-08-02 PROCEDURE — 99284 EMERGENCY DEPT VISIT MOD MDM: CPT | Performed by: INTERNAL MEDICINE

## 2023-08-02 PROCEDURE — 80053 COMPREHEN METABOLIC PANEL: CPT | Performed by: INTERNAL MEDICINE

## 2023-08-02 PROCEDURE — 96360 HYDRATION IV INFUSION INIT: CPT

## 2023-08-02 PROCEDURE — 84443 ASSAY THYROID STIM HORMONE: CPT | Performed by: INTERNAL MEDICINE

## 2023-08-02 PROCEDURE — 93010 ELECTROCARDIOGRAM REPORT: CPT | Performed by: INTERNAL MEDICINE

## 2023-08-02 PROCEDURE — 36415 COLL VENOUS BLD VENIPUNCTURE: CPT | Performed by: INTERNAL MEDICINE

## 2023-08-02 PROCEDURE — 258N000003 HC RX IP 258 OP 636: Performed by: INTERNAL MEDICINE

## 2023-08-02 PROCEDURE — 81003 URINALYSIS AUTO W/O SCOPE: CPT | Mod: XU | Performed by: INTERNAL MEDICINE

## 2023-08-02 PROCEDURE — 99284 EMERGENCY DEPT VISIT MOD MDM: CPT | Mod: 25

## 2023-08-02 RX ADMIN — SODIUM CHLORIDE 1000 ML: 9 INJECTION, SOLUTION INTRAVENOUS at 20:40

## 2023-08-02 ASSESSMENT — ACTIVITIES OF DAILY LIVING (ADL): ADLS_ACUITY_SCORE: 35

## 2023-08-03 NOTE — ED NOTES
Pt C/o palpations that he felt around 1430. Pt states he was at rest on his computer when it started. States he always has SOB sinc COVID in 2020 but feels it more acutely. Pt states at time pf palpation he felt slightly confused but denies the feeling now. Pt denies chest pain or nausea. Pt is A/Ox4 and ambulatory. Lungs are clear in all fields and bilaterally.

## 2023-08-03 NOTE — ED TRIAGE NOTES
Pt states since 1430 he has had palpations feeling in chest, dizziness, hard to concentrate, and increased SOB. Pt has had SOB since COVID in 2020. Pt is A/Ox4 and ambulatory.      Triage Assessment       Row Name 08/02/23 1948       Triage Assessment (Adult)    Airway WDL WDL       Respiratory WDL    Respiratory WDL X  SOB       Skin Circulation/Temperature WDL    Skin Circulation/Temperature WDL WDL       Cardiac WDL    Cardiac WDL chest pain       Chest Pain Assessment    Chest Pain Location other (see comments)  palpation    Duration 5hrs    Precipitating Factors at rest       Peripheral/Neurovascular WDL    Peripheral Neurovascular WDL WDL       Cognitive/Neuro/Behavioral WDL    Cognitive/Neuro/Behavioral WDL WDL

## 2023-08-03 NOTE — ED NOTES
Per provider orders pt was ambulated around room with pulse ox. Pt ambulated well without issue, SpO2 went from 99% on RA to 100%. Pt denies dizziness or SOB. Provider notified.

## 2023-08-04 ASSESSMENT — ENCOUNTER SYMPTOMS
SHORTNESS OF BREATH: 0
VOMITING: 0
FLANK PAIN: 0
ABDOMINAL DISTENTION: 0
DIZZINESS: 0
PALPITATIONS: 1
NUMBNESS: 0
ABDOMINAL PAIN: 0
COLOR CHANGE: 0
BLOOD IN STOOL: 0
CHEST TIGHTNESS: 0
HEADACHES: 0
CHILLS: 0
NAUSEA: 0
MYALGIAS: 0
WHEEZING: 0
COUGH: 0
NECK PAIN: 0
BACK PAIN: 0
FEVER: 0
DIAPHORESIS: 0
ANAL BLEEDING: 0
CONFUSION: 0
SLEEP DISTURBANCE: 0
DYSURIA: 0
FREQUENCY: 0
LIGHT-HEADEDNESS: 0
VOICE CHANGE: 0
WEAKNESS: 0

## 2023-08-05 NOTE — ED PROVIDER NOTES
"  History     Chief Complaint   Patient presents with    Tachycardia     The history is provided by the patient.   Palpitations  Palpitations quality:  Regular  Onset quality:  Sudden  Duration:  7 hours  Timing:  Constant  Chronicity:  New  Context: anxiety    Associated symptoms: no back pain, no chest pain, no cough, no diaphoresis, no dizziness, no nausea, no numbness, no shortness of breath, no vomiting and no weakness        Allergies:  Allergies   Allergen Reactions    Permethrin      rash       Problem List:    There are no problems to display for this patient.       Past Medical History:    No past medical history on file.    Past Surgical History:    Past Surgical History:   Procedure Laterality Date    APPENDECTOMY      BIOPSY OF SKIN LESION         Family History:    No family history on file.    Social History:  Marital Status:  Single [1]  Social History     Tobacco Use    Smoking status: Never    Smokeless tobacco: Never   Substance Use Topics    Alcohol use: Yes     Comment: \"one yomi's hard lemonade on Sundays\"    Drug use: No        Medications:    Acetaminophen (TYLENOL PO)  naproxen sodium (ALEVE) 220 MG capsule          Review of Systems   Constitutional:  Negative for chills, diaphoresis and fever.   HENT:  Negative for voice change.    Eyes:  Negative for visual disturbance.   Respiratory:  Negative for cough, chest tightness, shortness of breath and wheezing.    Cardiovascular:  Positive for palpitations. Negative for chest pain and leg swelling.   Gastrointestinal:  Negative for abdominal distention, abdominal pain, anal bleeding, blood in stool, nausea and vomiting.   Genitourinary:  Negative for decreased urine volume, dysuria, flank pain and frequency.   Musculoskeletal:  Negative for back pain, gait problem, myalgias and neck pain.   Skin:  Negative for color change, pallor and rash.   Neurological:  Negative for dizziness, syncope, weakness, light-headedness, numbness and headaches. "   Psychiatric/Behavioral:  Negative for confusion, sleep disturbance and suicidal ideas.        Physical Exam   BP: 120/69  Pulse: 114  Temp: 99.5  F (37.5  C)  Resp: 18  SpO2: 98 %      Physical Exam  Vitals and nursing note reviewed.   Constitutional:       Appearance: He is well-developed.   HENT:      Head: Normocephalic and atraumatic.   Eyes:      Conjunctiva/sclera: Conjunctivae normal.      Pupils: Pupils are equal, round, and reactive to light.   Neck:      Thyroid: No thyromegaly.      Vascular: No JVD.      Trachea: No tracheal deviation.   Cardiovascular:      Rate and Rhythm: Normal rate and regular rhythm.      Heart sounds: Normal heart sounds. No murmur heard.     No gallop.   Pulmonary:      Effort: Pulmonary effort is normal. No respiratory distress.      Breath sounds: Normal breath sounds. No stridor. No wheezing or rales.   Chest:      Chest wall: No tenderness.   Abdominal:      General: Bowel sounds are normal. There is no distension.      Palpations: Abdomen is soft. There is no mass.      Tenderness: There is no abdominal tenderness. There is no guarding or rebound.   Musculoskeletal:         General: No tenderness. Normal range of motion.      Cervical back: Normal range of motion and neck supple.   Lymphadenopathy:      Cervical: No cervical adenopathy.   Skin:     General: Skin is warm.      Coloration: Skin is not pale.      Findings: No erythema or rash.   Neurological:      Mental Status: He is alert and oriented to person, place, and time.   Psychiatric:         Behavior: Behavior normal.         ED Course                 Procedures                  No results found for this or any previous visit (from the past 24 hour(s)).    Medications   0.9% sodium chloride BOLUS (0 mLs Intravenous Stopped 8/2/23 2145)       Assessments & Plan (with Medical Decision Making)   Palpitation  EKG : Sinus tachy  Labs reivewed  Symptoms improved after observation in ER  D C home, follow-up with PCP/  cardio    I have reviewed the nursing notes.    I have reviewed the findings, diagnosis, plan and need for follow up with the patient.        Discharge Medication List as of 8/2/2023  9:53 PM          Final diagnoses:   Sinus tachycardia       8/2/2023   [facility]       Shree Mae MD  08/04/23 8263

## 2023-11-19 ENCOUNTER — HOSPITAL ENCOUNTER (EMERGENCY)
Facility: HOSPITAL | Age: 33
Discharge: HOME | End: 2023-11-19
Attending: FAMILY MEDICINE | Admitting: FAMILY MEDICINE
Payer: COMMERCIAL

## 2023-11-19 VITALS
SYSTOLIC BLOOD PRESSURE: 114 MMHG | DIASTOLIC BLOOD PRESSURE: 77 MMHG | HEART RATE: 89 BPM | OXYGEN SATURATION: 98 % | TEMPERATURE: 96.5 F | RESPIRATION RATE: 16 BRPM

## 2023-11-19 DIAGNOSIS — K04.7 DENTAL INFECTION: ICD-10-CM

## 2023-11-19 PROCEDURE — 96372 THER/PROPH/DIAG INJ SC/IM: CPT | Performed by: FAMILY MEDICINE

## 2023-11-19 PROCEDURE — 99284 EMERGENCY DEPT VISIT MOD MDM: CPT

## 2023-11-19 PROCEDURE — 99283 EMERGENCY DEPT VISIT LOW MDM: CPT | Performed by: FAMILY MEDICINE

## 2023-11-19 PROCEDURE — 250N000011 HC RX IP 250 OP 636: Mod: JZ | Performed by: FAMILY MEDICINE

## 2023-11-19 RX ORDER — CEFTRIAXONE SODIUM 1 G
1 VIAL (EA) INJECTION ONCE
Status: COMPLETED | OUTPATIENT
Start: 2023-11-19 | End: 2023-11-19

## 2023-11-19 RX ORDER — KETOROLAC TROMETHAMINE 30 MG/ML
60 INJECTION, SOLUTION INTRAMUSCULAR; INTRAVENOUS ONCE
Status: COMPLETED | OUTPATIENT
Start: 2023-11-19 | End: 2023-11-19

## 2023-11-19 RX ORDER — IBUPROFEN 800 MG/1
800 TABLET, FILM COATED ORAL EVERY 8 HOURS PRN
COMMUNITY

## 2023-11-19 RX ADMIN — KETOROLAC TROMETHAMINE 60 MG: 30 INJECTION, SOLUTION INTRAMUSCULAR at 03:15

## 2023-11-19 RX ADMIN — CEFTRIAXONE 1 G: 1 INJECTION, POWDER, FOR SOLUTION INTRAMUSCULAR; INTRAVENOUS at 03:15

## 2023-11-19 ASSESSMENT — ACTIVITIES OF DAILY LIVING (ADL): ADLS_ACUITY_SCORE: 33

## 2023-11-19 NOTE — DISCHARGE INSTRUCTIONS
Rest and stay well-hydrated  Alternate Tylenol and ibuprofen for pain and discomfort or fever  Orajel, gargle salt and water  See a dentist as soon as possible  Close follow-up with PCP  Come back for any concern or any worsening symptoms

## 2023-11-19 NOTE — ED TRIAGE NOTES
Patient presents via triage with c/o left, upper dental pain for a couple days. Patient states pain had been intermittent, but is now more constant. Patient also reports that pain is now in upper and lower and jaw. Patient states that he left work due pain.

## 2023-11-19 NOTE — ED PROVIDER NOTES
"  History     Chief Complaint   Patient presents with    Dental Pain     Left, upper side for a couple days     HPI  Ryan Marte is a 33 year old male who presented to the ER with a chief complaint of left upper tooth ache that has been going on for a few days, worse today.  Denies any fever or chills.  Denies any difficulty breathing or swallowing.  Denies any chest pain or shortness of breath.  Denies any nausea vomiting or diaphoresis.  Denies any abdominal pain.  Denies any urinary symptoms.  Has been using Tylenol and ibuprofen.    Allergies:  Allergies   Allergen Reactions    Permethrin      rash       Problem List:    There are no problems to display for this patient.       Past Medical History:    No past medical history on file.    Past Surgical History:    Past Surgical History:   Procedure Laterality Date    APPENDECTOMY      BIOPSY OF SKIN LESION         Family History:    No family history on file.    Social History:  Marital Status:  Single [1]  Social History     Tobacco Use    Smoking status: Never    Smokeless tobacco: Never   Substance Use Topics    Alcohol use: Yes     Comment: \"one yomi's hard lemonade on Sundays\"    Drug use: No        Medications:    amoxicillin-clavulanate (AUGMENTIN) 875-125 MG tablet  ibuprofen (ADVIL/MOTRIN) 800 MG tablet  UNKNOWN TO PATIENT          Review of Systems   All other systems reviewed and are negative.      Physical Exam   BP: 114/77  Pulse: 89  Temp: (!) 96.5  F (35.8  C)  Resp: 16  SpO2: 98 %      Physical Exam  Constitutional:       General: He is not in acute distress.     Appearance: He is not ill-appearing, toxic-appearing or diaphoretic.   HENT:      Head: Atraumatic.      Nose: Nose normal. No congestion or rhinorrhea.      Mouth/Throat:      Comments: Left upper gum erythema, dental caries, no discharge  Eyes:      General: No scleral icterus.        Left eye: No discharge.      Extraocular Movements: Extraocular movements intact.      " Conjunctiva/sclera: Conjunctivae normal.      Pupils: Pupils are equal, round, and reactive to light.   Neck:      Vascular: No carotid bruit.   Cardiovascular:      Rate and Rhythm: Normal rate and regular rhythm.      Heart sounds: Normal heart sounds. No murmur heard.  Pulmonary:      Effort: Pulmonary effort is normal. No respiratory distress.      Breath sounds: Normal breath sounds. No stridor. No wheezing, rhonchi or rales.   Chest:      Chest wall: No tenderness.   Abdominal:      General: Bowel sounds are normal. There is no distension.      Palpations: Abdomen is soft. There is no mass.      Tenderness: There is no abdominal tenderness. There is no right CVA tenderness, left CVA tenderness, guarding or rebound.      Hernia: No hernia is present.   Musculoskeletal:         General: No tenderness.      Cervical back: Normal range of motion and neck supple. No rigidity or tenderness.   Lymphadenopathy:      Cervical: No cervical adenopathy.   Skin:     General: Skin is warm.      Findings: No rash.   Neurological:      General: No focal deficit present.      Mental Status: He is oriented to person, place, and time. Mental status is at baseline.      Cranial Nerves: No cranial nerve deficit.      Sensory: No sensory deficit.      Motor: No weakness.      Coordination: Coordination normal.      Gait: Gait normal.      Deep Tendon Reflexes: Reflexes normal.   Psychiatric:         Mood and Affect: Mood normal.         ED Course            Patient was seen and examined shortly after arrival.  Stable.  Given 60 mg IM Toradol, 1 g IM Rocephin and started on Augmentin.  Symptoms improved.  Advised to  Rest and stay well-hydrated  Alternate Tylenol and ibuprofen for pain and discomfort or fever  Orajel, gargle salt and water  See a dentist as soon as possible  Close follow-up with PCP  Come back for any concern or any worsening symptoms patient agrees with plan.  Stable for discharge.         Procedures               No results found for this or any previous visit (from the past 24 hour(s)).    Medications   cefTRIAXone (ROCEPHIN) in lidocaine 1% (PF) for IM administration 1 g (has no administration in time range)   ketorolac (TORADOL) injection 60 mg (has no administration in time range)       Assessments & Plan (with Medical Decision Making)     I have reviewed the nursing notes.    I have reviewed the findings, diagnosis, plan and need for follow up with the patient.        New Prescriptions    AMOXICILLIN-CLAVULANATE (AUGMENTIN) 875-125 MG TABLET    Take 1 tablet by mouth 2 times daily for 10 days       Final diagnoses:   Dental infection       11/19/2023   [facility]       Allan Zamora MD  11/19/23 4732

## 2023-12-25 ENCOUNTER — HOSPITAL ENCOUNTER (EMERGENCY)
Facility: HOSPITAL | Age: 33
Discharge: HOME | End: 2023-12-25
Attending: EMERGENCY MEDICINE | Admitting: EMERGENCY MEDICINE
Payer: OTHER MISCELLANEOUS

## 2023-12-25 ENCOUNTER — APPOINTMENT (OUTPATIENT)
Dept: GENERAL RADIOLOGY | Facility: HOSPITAL | Age: 33
End: 2023-12-25
Attending: EMERGENCY MEDICINE
Payer: OTHER MISCELLANEOUS

## 2023-12-25 VITALS
SYSTOLIC BLOOD PRESSURE: 118 MMHG | OXYGEN SATURATION: 97 % | TEMPERATURE: 96.9 F | RESPIRATION RATE: 20 BRPM | DIASTOLIC BLOOD PRESSURE: 80 MMHG | HEART RATE: 109 BPM

## 2023-12-25 DIAGNOSIS — M25.512 ACUTE PAIN OF LEFT SHOULDER: ICD-10-CM

## 2023-12-25 PROCEDURE — 73030 X-RAY EXAM OF SHOULDER: CPT | Mod: LT

## 2023-12-25 PROCEDURE — 99283 EMERGENCY DEPT VISIT LOW MDM: CPT | Performed by: EMERGENCY MEDICINE

## 2023-12-25 PROCEDURE — 250N000013 HC RX MED GY IP 250 OP 250 PS 637: Performed by: EMERGENCY MEDICINE

## 2023-12-25 PROCEDURE — 99283 EMERGENCY DEPT VISIT LOW MDM: CPT

## 2023-12-25 RX ORDER — ACETAMINOPHEN 325 MG/1
975 TABLET ORAL ONCE
Status: COMPLETED | OUTPATIENT
Start: 2023-12-25 | End: 2023-12-25

## 2023-12-25 RX ORDER — IBUPROFEN 800 MG/1
800 TABLET, FILM COATED ORAL ONCE
Status: COMPLETED | OUTPATIENT
Start: 2023-12-25 | End: 2023-12-25

## 2023-12-25 RX ADMIN — IBUPROFEN 800 MG: 800 TABLET ORAL at 04:01

## 2023-12-25 RX ADMIN — ACETAMINOPHEN 975 MG: 325 TABLET, FILM COATED ORAL at 04:01

## 2023-12-25 ASSESSMENT — ENCOUNTER SYMPTOMS
SHORTNESS OF BREATH: 0
FEVER: 0
CHILLS: 0

## 2023-12-25 NOTE — ED PROVIDER NOTES
"  History     Chief Complaint   Patient presents with    Arm Pain     HPI  Ryan Marte is a 33 year old male who is here with pain left shoulder.  Slipped on water at work and fell on his left shoulder.    Allergies:  Allergies   Allergen Reactions    Permethrin      rash       Problem List:    There are no problems to display for this patient.       Past Medical History:    No past medical history on file.    Past Surgical History:    Past Surgical History:   Procedure Laterality Date    APPENDECTOMY      BIOPSY OF SKIN LESION         Family History:    No family history on file.    Social History:  Marital Status:  Single [1]  Social History     Tobacco Use    Smoking status: Never    Smokeless tobacco: Never   Substance Use Topics    Alcohol use: Yes     Comment: \"one yomi's hard lemonade on Sundays\"    Drug use: No        Medications:    ibuprofen (ADVIL/MOTRIN) 800 MG tablet  UNKNOWN TO PATIENT          Review of Systems   Constitutional:  Negative for chills and fever.   Respiratory:  Negative for shortness of breath.    Cardiovascular:  Negative for chest pain.   All other systems reviewed and are negative.      Physical Exam   BP: 118/80  Pulse: 109  Temp: 96.9  F (36.1  C)  Resp: 20  SpO2: 97 %      Physical Exam  Vitals and nursing note reviewed.   Constitutional:       General: He is not in acute distress.     Appearance: Normal appearance. He is not ill-appearing, toxic-appearing or diaphoretic.   HENT:      Head: Normocephalic and atraumatic.      Right Ear: External ear normal.      Left Ear: External ear normal.   Eyes:      General: No scleral icterus.     Conjunctiva/sclera: Conjunctivae normal.   Pulmonary:      Effort: Pulmonary effort is normal. No respiratory distress.   Musculoskeletal:      Comments: No clinically relevant tenderness to left shoulder.  Trace decrease in range of motion.  2+ radial pulse.  5 5 .   Skin:     General: Skin is warm and dry.      Capillary Refill: Capillary " refill takes less than 2 seconds.   Neurological:      General: No focal deficit present.      Mental Status: He is alert and oriented to person, place, and time. Mental status is at baseline.   Psychiatric:         Mood and Affect: Mood normal.         Behavior: Behavior normal.         ED Course                 Procedures             Critical Care time:               No results found for this or any previous visit (from the past 24 hour(s)).    Medications   ibuprofen (ADVIL/MOTRIN) tablet 800 mg (800 mg Oral $Given 12/25/23 0401)   acetaminophen (TYLENOL) tablet 975 mg (975 mg Oral $Given 12/25/23 0401)       Assessments & Plan (with Medical Decision Making)     I have reviewed the nursing notes.    I have reviewed the findings, diagnosis, plan and need for follow up with the patient.          Medical Decision Making  The patient's presentation was of straightforward complexity (a clearly self-limited or minor problem).    The patient's evaluation involved:  history and exam without other MDM data elements    The patient's management necessitated only low risk treatment.    33-year-old male here with probable contusion left shoulder.  X-ray unremarkable.  Discharged home.    Discharge Medication List as of 12/25/2023  3:57 AM          Final diagnoses:   Acute pain of left shoulder       12/25/2023   [facility]       Tay Reyna MD  12/25/23 7433

## 2023-12-25 NOTE — ED TRIAGE NOTES
Patient presents to the ED after he fell on a wet floor while at work.   C/O left arm pain after fall.   Rates a pain a 1 out of 10 @ rest & a 7 out of 10 w/ movement.  Denies hitting his head or LOC.     Triage Assessment (Adult)       Row Name 12/25/23 0156          Triage Assessment    Airway WDL WDL        Respiratory WDL    Respiratory WDL WDL;rhythm/pattern;expansion/retractions     Rhythm/Pattern, Respiratory unlabored;pattern regular;depth regular;no shortness of breath reported     Expansion/Accessory Muscles/Retractions no use of accessory muscles;no retractions;expansion symmetric

## 2024-07-06 ENCOUNTER — HEALTH MAINTENANCE LETTER (OUTPATIENT)
Age: 34
End: 2024-07-06

## 2024-11-09 ENCOUNTER — TRANSFERRED RECORDS (OUTPATIENT)
Dept: HEALTH INFORMATION MANAGEMENT | Facility: CLINIC | Age: 34
End: 2024-11-09

## 2024-11-19 ENCOUNTER — TRANSFERRED RECORDS (OUTPATIENT)
Dept: HEALTH INFORMATION MANAGEMENT | Facility: CLINIC | Age: 34
End: 2024-11-19

## 2024-11-25 DIAGNOSIS — R40.0 SLEEPINESS: Primary | ICD-10-CM

## 2024-11-25 DIAGNOSIS — R06.83 SNORING: ICD-10-CM

## 2024-12-03 ENCOUNTER — APPOINTMENT (OUTPATIENT)
Dept: GENERAL RADIOLOGY | Facility: HOSPITAL | Age: 34
End: 2024-12-03
Attending: INTERNAL MEDICINE
Payer: COMMERCIAL

## 2024-12-03 ENCOUNTER — HOSPITAL ENCOUNTER (EMERGENCY)
Facility: HOSPITAL | Age: 34
Discharge: HOME | End: 2024-12-03
Attending: INTERNAL MEDICINE
Payer: COMMERCIAL

## 2024-12-03 VITALS
TEMPERATURE: 97.1 F | DIASTOLIC BLOOD PRESSURE: 84 MMHG | HEART RATE: 92 BPM | RESPIRATION RATE: 16 BRPM | SYSTOLIC BLOOD PRESSURE: 117 MMHG | OXYGEN SATURATION: 100 %

## 2024-12-03 DIAGNOSIS — S50.01XA CONTUSION OF RIGHT ELBOW, INITIAL ENCOUNTER: ICD-10-CM

## 2024-12-03 DIAGNOSIS — S53.401A SPRAIN OF RIGHT ELBOW, INITIAL ENCOUNTER: ICD-10-CM

## 2024-12-03 PROCEDURE — 99283 EMERGENCY DEPT VISIT LOW MDM: CPT | Performed by: INTERNAL MEDICINE

## 2024-12-03 PROCEDURE — 99283 EMERGENCY DEPT VISIT LOW MDM: CPT

## 2024-12-03 PROCEDURE — 73070 X-RAY EXAM OF ELBOW: CPT | Mod: RT

## 2024-12-03 ASSESSMENT — COLUMBIA-SUICIDE SEVERITY RATING SCALE - C-SSRS
6. HAVE YOU EVER DONE ANYTHING, STARTED TO DO ANYTHING, OR PREPARED TO DO ANYTHING TO END YOUR LIFE?: NO
2. HAVE YOU ACTUALLY HAD ANY THOUGHTS OF KILLING YOURSELF IN THE PAST MONTH?: NO
1. IN THE PAST MONTH, HAVE YOU WISHED YOU WERE DEAD OR WISHED YOU COULD GO TO SLEEP AND NOT WAKE UP?: NO

## 2024-12-03 NOTE — ED TRIAGE NOTES
States that around 0430 he fell from standing position and landed on right hand/elbow trying to catch self. Has pain mainly in right elbow. Denies LOC or hitting head.      Triage Assessment (Adult)       Row Name 12/03/24 0603          Triage Assessment    Airway WDL WDL

## 2024-12-07 ASSESSMENT — ENCOUNTER SYMPTOMS
COUGH: 0
SHORTNESS OF BREATH: 0
ABDOMINAL PAIN: 0
FEVER: 0

## 2024-12-08 NOTE — ED PROVIDER NOTES
"  History     Chief Complaint   Patient presents with    Elbow Injury     The history is provided by the patient.   Arm Injury  Location:  Elbow  Elbow location:  R elbow  Injury: yes    Time since incident:  2 hours  Pain details:     Quality:  Aching    Severity:  Mild    Onset quality:  Sudden  Associated symptoms: no fever      Allergies:  Allergies   Allergen Reactions    Permethrin      rash       Problem List:    There are no active problems to display for this patient.       Past Medical History:    No past medical history on file.    Past Surgical History:    Past Surgical History:   Procedure Laterality Date    APPENDECTOMY      BIOPSY OF SKIN LESION         Family History:    No family history on file.    Social History:  Marital Status:  Single [1]  Social History     Tobacco Use    Smoking status: Never    Smokeless tobacco: Never   Substance Use Topics    Alcohol use: Yes     Comment: \"one yomi's hard lemonade on Sundays\"    Drug use: No        Medications:    UNKNOWN TO PATIENT  ibuprofen (ADVIL/MOTRIN) 800 MG tablet          Review of Systems   Constitutional:  Negative for fever.   Respiratory:  Negative for cough and shortness of breath.    Cardiovascular:  Negative for chest pain.   Gastrointestinal:  Negative for abdominal pain.   Skin:  Negative for rash.   All other systems reviewed and are negative.      Physical Exam   BP: 117/84  Pulse: 92  Temp: 97.1  F (36.2  C)  Resp: 16  SpO2: 100 %      Physical Exam  Constitutional:       General: He is not in acute distress.     Appearance: Normal appearance. He is not toxic-appearing.   HENT:      Head: Atraumatic.   Eyes:      General: No scleral icterus.     Conjunctiva/sclera: Conjunctivae normal.   Cardiovascular:      Rate and Rhythm: Normal rate.      Heart sounds: Normal heart sounds.   Pulmonary:      Effort: Pulmonary effort is normal. No respiratory distress.      Breath sounds: Normal breath sounds.   Abdominal:      Palpations: Abdomen is " soft.      Tenderness: There is no abdominal tenderness.   Musculoskeletal:         General: No deformity.      Right elbow: No deformity or lacerations. Normal range of motion. No tenderness.      Left elbow: Normal.      Cervical back: Neck supple.   Skin:     General: Skin is warm.   Neurological:      Mental Status: He is alert.         ED Course        Procedures                No results found for this or any previous visit (from the past 24 hours).    Medications - No data to display    Assessments & Plan (with Medical Decision Making)   Fell on his elbow, slight redness on right elbow, ROM normal and painless  Xray : no definite bony abnormality  Pt refused pain medication  D C Home, follow-up with PCP  I have reviewed the nursing notes.    I have reviewed the findings, diagnosis, plan and need for follow up with the patient.        Discharge Medication List as of 12/3/2024  6:47 AM          Final diagnoses:   Contusion of right elbow, initial encounter   Sprain of right elbow, initial encounter       12/3/2024   [facility]       Shree Mae MD  12/07/24 2023

## 2024-12-12 DIAGNOSIS — R06.83 SNORING: Primary | ICD-10-CM

## 2024-12-12 DIAGNOSIS — G47.30 SLEEP APNEA: ICD-10-CM

## 2024-12-13 ENCOUNTER — MYC MEDICAL ADVICE (OUTPATIENT)
Dept: PULMONOLOGY | Facility: OTHER | Age: 34
End: 2024-12-13

## 2024-12-13 ASSESSMENT — SLEEP AND FATIGUE QUESTIONNAIRES
HOW LIKELY ARE YOU TO NOD OFF OR FALL ASLEEP WHILE WATCHING TV: SLIGHT CHANCE OF DOZING
HOW LIKELY ARE YOU TO NOD OFF OR FALL ASLEEP WHILE SITTING AND TALKING TO SOMEONE: WOULD NEVER DOZE
HOW LIKELY ARE YOU TO NOD OFF OR FALL ASLEEP WHILE SITTING QUIETLY AFTER LUNCH WITHOUT ALCOHOL: SLIGHT CHANCE OF DOZING
HOW LIKELY ARE YOU TO NOD OFF OR FALL ASLEEP WHILE LYING DOWN TO REST IN THE AFTERNOON WHEN CIRCUMSTANCES PERMIT: MODERATE CHANCE OF DOZING
HOW LIKELY ARE YOU TO NOD OFF OR FALL ASLEEP IN A CAR, WHILE STOPPED FOR A FEW MINUTES IN TRAFFIC: SLIGHT CHANCE OF DOZING
HOW LIKELY ARE YOU TO NOD OFF OR FALL ASLEEP WHEN YOU ARE A PASSENGER IN A CAR FOR AN HOUR WITHOUT A BREAK: SLIGHT CHANCE OF DOZING
HOW LIKELY ARE YOU TO NOD OFF OR FALL ASLEEP WHILE SITTING AND READING: WOULD NEVER DOZE
HOW LIKELY ARE YOU TO NOD OFF OR FALL ASLEEP WHILE SITTING INACTIVE IN A PUBLIC PLACE: WOULD NEVER DOZE

## 2024-12-16 NOTE — PROGRESS NOTES
12/13/24 1511   Reason For Your Visit   Please briefly describe the main reason(s) for your sleep visit (Patient-Rptd)  To check if i have sleep apnea or not   Approximately when did this problem start (Patient-Rptd)  Sometime in the last few years   What are your goals for this visit (Patient-Rptd)  To find out if i have sleep apnea or something else   Time in Bed - Work Or School Days   Do you work or go to school (Patient-Rptd)  Yes   What time do you usually get into bed (Patient-Rptd)  Between 9-10 am because i work night shift   About how long does it take you to fall asleep (Patient-Rptd)  About 10 minutes with an OTC sleep aid   How often do you have trouble falling asleep (Patient-Rptd)  7   How often do you wake up during the night (Patient-Rptd)  5-6   Do you work days/evenings/nights/rotating shifts (Patient-Rptd)  Nights   What wakes you up at night (Patient-Rptd)  Pain;External stimuli (bed partner, pets, noise, etc);Use the bathroom;Nightmares   How often do you have trouble falling back to sleep (Patient-Rptd)  6   About how long does it take to fall back to sleep (Patient-Rptd)  10-15 minutes   What do you usually do if you have trouble getting back to sleep (Patient-Rptd)  I just wake up   What time do you usually get out of bed to start your day (Patient-Rptd)  10 pm   Do you use an alarm (Patient-Rptd)  Yes   Time in Bed - Weekends/Non-work Days/All Other Days   What time do you usually get into bed (Patient-Rptd)  Noon   About how long does it take you to fall asleep (Patient-Rptd)  15-20 minutes   What time do you usually get out of bed to start your day (Patient-Rptd)  1 am   Do you use an alarm (Patient-Rptd)  No   Sleep Need   On average, about how much sleep do you think you get (Patient-Rptd)  3-5 hours   About how much sleep do you think you need (Patient-Rptd)  9 hours   Sleep Position   Which sleep positions do you prefer (Patient-Rptd)  Back;Side;Head Elevated   Do you do any of the  following activities in bed (Patient-Rptd)  Other   If other, what (Patient-Rptd)  I listen to a Bible Meditation rupesh   How often do you take a nap on purpose (Patient-Rptd)  5   About how long are your naps (Patient-Rptd)  5 hours   Do you feel better after naps (Patient-Rptd)  No   How often do you doze off unintentionally (Patient-Rptd)  0   Have you ever had a driving accident or near-miss due to sleepiness/drowsiness (Patient-Rptd)  No   Sleep Disruptions - Breathing/Snoring   Do you snore (Patient-Rptd)  Yes   Do other people complain about your snoring (Patient-Rptd)  Yes   Have you been told you stop breathing in your sleep (Patient-Rptd)  Yes   Do you have issues with any of the following (Patient-Rptd)  Stuffy nose when you wake up;Heartburn or reflux at night;Getting up to urinate more than once   Sleep Disruptions - Movement   Do you get pain, discomfort, with an urge to move (Patient-Rptd)  Yes   Does it happen when you are resting (Patient-Rptd)  Yes   Does it get better if you move around (Patient-Rptd)  Yes   Does it happen more at night (Patient-Rptd)  No   Have you been told you kick your legs at night (Patient-Rptd)  No   Sleep Disruptions - Behaviours in Sleep   Have you ever experienced any of the following during your sleep (Patient-Rptd)  Sleep-talking;Waking up paralyzed   Do you ever experience sudden muscle weakness during the day (Patient-Rptd)  No   4) Is there anything else you would like your sleep provider to know (Patient-Rptd)  Not that i can think of.   Caffeine, Alcohol and Other Substances   How many caffeinated beverages (coffee, tea, soda, energy drinks) per day (Patient-Rptd)  1-2 cups of coffee only on nights i work   What time of day is your last caffeine use (Patient-Rptd)  Night time   List any prescribed or over the counter stimulants that you take (Patient-Rptd)  None   List any prescribed or over the counter sleep medication you take (Patient-Rptd)  Equate Night Pain &  Sleep Aid   List previous sleep medications you have tried (Patient-Rptd)  ZZZquil   Do you drink alcohol to help you sleep (Patient-Rptd)  No   Do you drink alcohol near bedtime (Patient-Rptd)  No   Family History   Has any family member been diagnosed with a sleep disorder (Patient-Rptd)  Yes   If yes, please indicate (Patient-Rptd)  My grandfather a few years ago   In the last TWO WEEKS have you experienced any of the following symptoms?   Fevers (Patient-Rptd)  No   Night Sweats (Patient-Rptd)  No   Weight Gain (Patient-Rptd)  Yes   Pain at Night (Patient-Rptd)  Yes   Double Vision (Patient-Rptd)  No   Changes in Vision (Patient-Rptd)  No   Difficulty Breathing through Nose (Patient-Rptd)  Yes   Sore Throat in Morning (Patient-Rptd)  Yes   Dry Mouth in the Morning (Patient-Rptd)  Yes   Shortness of Breath Lying Flat (Patient-Rptd)  Yes   Shortness of Breath With Activity (Patient-Rptd)  Yes   Awakening with Shortness of Breath (Patient-Rptd)  Yes   Increased Cough (Patient-Rptd)  No   Heart Racing at Night (Patient-Rptd)  No   Swelling in Feet or Legs (Patient-Rptd)  No   Diarrhea at Night (Patient-Rptd)  No   Heartburn at Night (Patient-Rptd)  Yes   Urinating More than Once at Night (Patient-Rptd)  Yes   Losing Control of Urine at Night (Patient-Rptd)  No   Joint Pains at Night (Patient-Rptd)  Yes   Headaches in Morning (Patient-Rptd)  Yes   Weakness in Arms or Legs (Patient-Rptd)  No   Depressed Mood (Patient-Rptd)  Yes   Anxiety (Patient-Rptd)  Yes         12/13/2024     3:14 PM    [place] Sleepiness Scale ( OLIVER Garcia  [id number]<br>ESS - [place]/English - Final version - 21 Nov 07 - [facility] [place].)   Sitting and reading Would never doze   Watching TV Slight chance of dozing   Sitting, inactive in a public place (e.g. a theatre or a meeting) Would never doze   As a passenger in a car for an hour without a break Slight chance of dozing   Lying down to rest in the afternoon when circumstances permit  Moderate chance of dozing   Sitting and talking to someone Would never doze   Sitting quietly after a lunch without alcohol Slight chance of dozing   In a car, while stopped for a few minutes in traffic Slight chance of dozing   [place] Score (MC) 6   Denver Score (Sleep) 6        Patient-reported         12/13/2024     3:13 PM   Insomnia Severity Index (SHIRIN)   Difficulty falling asleep 2    Difficulty staying asleep 3    Problems waking up too early 3    How SATISFIED/DISSATISFIED are you with your CURRENT sleep pattern? 3    How NOTICEABLE to others do you think your sleep problem is in terms of impairing the quality of your life? 4    How WORRIED/DISTRESSED are you about your current sleep problem? 2    To what extent do you consider your sleep problem to INTERFERE with your daily functioning (e.g. daytime fatigue, mood, ability to function at work/daily chores, concentration, memory, mood, etc.) CURRENTLY? 3    SHIRIN Total Score 20        Patient-reported         12/13/2024     3:14 PM   STOP BANG Questionnaire (  2008, the American Society of Anesthesiologists, Inc[employer]   1. Snoring - Do you snore loudly (louder than talking or loud enough to be heard through closed doors)? Yes   2. Tired - Do you often feel tired, fatigued, or sleepy during daytime? Yes   3. Observed - Has anyone observed you stop breathing during your sleep? Yes   4. Blood pressure - Do you have or are you being treated for high blood pressure? No   5. BMI - BMI more than 35 kg/m2? No   6. Age - Age over 50 yr old? No   7. Neck circumference - Neck circumference greater than 40 cm? No   8. Gender - Gender male? Yes   STOP BANG Score (MC): 4 (High risk of SOCORRO)

## 2024-12-17 NOTE — TELEPHONE ENCOUNTER
"Chart review prior to sleep testing.    Patient Summary:  34 year old male who is referred for sleep-disordered breathing.    There are no active problems to display for this patient.      Current Outpatient Medications   Medication Sig Dispense Refill    ibuprofen (ADVIL/MOTRIN) 800 MG tablet Take 800 mg by mouth every 8 hours as needed for moderate pain      UNKNOWN TO PATIENT OTC sleep medication \"with tylenol\"       No current facility-administered medications for this visit.     Reviewed scanned notes from [facility] with [facility] on 11/19/2024.  Concerns of loud snoring, observed apnea, daytime fatigue.  Minimal PMHx.    Pertinent PMHx appears minimal.    STOP-BANG score of 4, with unknown neck circumference.  [place] score of 6.  SHIRIN: 20    BMI of Estimated body mass index is 26.34 kg/m  as calculated from the following:    Height as of 10/24/19: 1.575 m (5' 2\").    Weight as of 10/24/19: 65.3 kg (144 lb).     Chief concern per questionnaire: \"To check if i have sleep apnea or not \"    Duration of symptoms:  \"Sometime in the last few years \"    Goals for visit per questionnaire: \"To find out if i have sleep apnea or something else \"    Sleep pattern:  Yes for shift work  Workdays.  9-10am to 10pm  Weekends.  Noon - 1am.  Time to fall asleep: ~10 minutes.  Awakenings: 5-6 times per night, 10-15 minutes to return to sleep.  Average total sleep time:  3-5 hours (?)  Napping.  5 days per week, 5 hours per nap.    No for RLS screen.  No for sleep walking.  No for dream enactment behavior.  No for bruxism.    Yes for sleep paralysis.    No for morning headaches.  Yes for snoring.  Yes for observed apnea.  Yes for FHx of SOCORRO - grandfather.    Caffeine use:  No for 3+ per day.  No for within 6 hours of bed.    A/P:    1.)  High likelihood of SOCORRO with STOP-BANG score of 4.    Would appear to be candidate for either home sleep testing or in-lab PSG.    2.)  Chronic insomnia with somewhat unclear timing " "of sleep versus \"napping\" in setting of shift work.    ---  This note was written with the assistance of the Dragon voice-dictation technology software. The final document, although reviewed, may contain errors. For corrections, please contact the office.    Atilio Riggs MD    Sleep Medicine  [place], [place]  Main Office: [phone]  [facility] [address], [address]  Schedule visits: [phone]  Main Office: [phone]  Fax: [phone]    "

## 2024-12-18 DIAGNOSIS — G47.00 INSOMNIA WITH SLEEP APNEA: ICD-10-CM

## 2024-12-18 DIAGNOSIS — R06.83 SNORING: Primary | ICD-10-CM

## 2024-12-18 DIAGNOSIS — G47.30 INSOMNIA WITH SLEEP APNEA: ICD-10-CM

## 2024-12-18 DIAGNOSIS — G47.10 HYPERSOMNIA: ICD-10-CM

## 2024-12-29 ENCOUNTER — THERAPY VISIT (OUTPATIENT)
Dept: SLEEP MEDICINE | Facility: HOSPITAL | Age: 34
End: 2024-12-29
Attending: FAMILY MEDICINE
Payer: COMMERCIAL

## 2024-12-29 DIAGNOSIS — G47.00 INSOMNIA WITH SLEEP APNEA: ICD-10-CM

## 2024-12-29 DIAGNOSIS — G47.10 HYPERSOMNIA: ICD-10-CM

## 2024-12-29 DIAGNOSIS — G47.30 INSOMNIA WITH SLEEP APNEA: ICD-10-CM

## 2024-12-29 DIAGNOSIS — R06.83 SNORING: ICD-10-CM

## 2024-12-29 PROCEDURE — 95810 POLYSOM 6/> YRS 4/> PARAM: CPT

## 2024-12-30 NOTE — PROGRESS NOTES
Patient is here with a concern for sleep disordered breathing. All stages of sleep were observed with efficiency of 68.4. there was snoring and respiratory events with an AHI of 46.7 and low SPO2 of 80 and 5 minutes below 89%. Heart rate was normal and patient tolerated test well.

## 2025-01-06 NOTE — PROGRESS NOTES
"Ryan Marte is a 34 year old male who is being evaluated via a billable video visit.       The patient has been notified of following:      \"This video visit will be conducted via a call between you and your physician/provider. We have found that certain health care needs can be provided without the need for an in-person physical exam.  This service lets us provide the care you need with a video conversation.  If a prescription is necessary we can send it directly to your pharmacy.  If lab work is needed we can place an order for that and you can then stop by our lab to have the test done at a later time.     Video visits are billed at different rates depending on your insurance coverage.  Please reach out to your insurance provider with any questions.     If during the course of the call the physician/provider feels a video visit is not appropriate, you will not be charged for this service.\"     Patient has given verbal consent for Video visit? Yes  How would you like to obtain your AVS? Mail a copy  If you are dropped from the video visit, the video invite should be resent to: Text to cell phone: -  Will anyone else be joining your video visit? No  If patient encounters technical issues they should call [phone]      Video-Visit Details     Type of service:  Video Visit     Start Time:  3pm  End Time:  3:20pm    Originating Location (pt. Location): Home     Distant Location (provider location):  [facility]       Platform used for Video Visit: Seguricel    Virtual visit for review of sleep testing results.     A/P:     1.)  Severe SOCORRO (AHI 46.7) without sleep-associated hypoxemia.   - Potential that severity under-reported given low percentage of REM and minimal supine REM observed.     2.)  Hypersomnia -stable but not at goal    3.) chronic sleep maintenance insomnia -stable not at goal    We discussed that I would recommend treatment to reduce long-term cardiovascular risk factors, " "as well as his his symptoms of disrupted sleep and daytime sleepiness.    Plan to proceed with CPAP auto titrate 5-15 cm H2O, orders placed today.         SUBJECTIVE:  Ryan Marte is a 34 year old male.    34 year old male who is referred for sleep-disordered breathing.     Reviewed scanned notes from Ching Brown CNP with [facility] on 11/19/2024.  Concerns of loud snoring, observed apnea, daytime fatigue.  Minimal PMHx.     Pertinent PMHx appears minimal.     STOP-BANG score of 4, with unknown neck circumference.  [place] score of 6.  SHIRIN: 20     BMI of Estimated body mass index is 26.34 kg/m  as calculated from the following:    Height as of 10/24/19: 1.575 m (5' 2\").    Weight as of 10/24/19: 65.3 kg (144 lb).      Today -we reviewed his sleep study results in more detail.  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  He will typically stay on an overnight awake and daytime sleeping pattern on his off days.    He also asked regarding concerns from his Apple Watch of reports of bradycardia.  Heart rates between 48-55 bpm detected during sleep, we discussed that this would be considered in the normal range for sleep.    Chief concern per questionnaire: \"To check if i have sleep apnea or not \"     Duration of symptoms:  \"Sometime in the last few years \"     Goals for visit per questionnaire: \"To find out if i have sleep apnea or something else \"     Sleep pattern:  Yes for shift work  Workdays.  9-10am to 10pm  Weekends.  Noon - 1am.  Time to fall asleep: ~10 minutes.  Awakenings: 5-6 times per night, 10-15 minutes to return to sleep.  Average total sleep time:  3-5 hours (?)  Napping.  5 days per week, 5 hours per nap.     No for RLS screen.  No for sleep walking.  No for dream enactment behavior.  No for bruxism.     Yes for sleep paralysis.     No for morning headaches.  Yes for snoring.  Yes for observed apnea.  Yes for FHx of SOCORRO - grandfather.   "   Caffeine use:  No for 3+ per day.  No for within 6 hours of bed.    SLEEP STUDY INTERPRETATION  DIAGNOSTIC POLYSOMNOGRAPHY REPORT        Patient: AIRAM ZARCO  YOB: 1990  Study Date: 12/29/2024  MRN: [id number]  Referring Provider: Ching Brown MD  Ordering Provider: Atilio Riggs MD     Indications for Polysomnography: The patient is a 34 year old Male who is 5'2  and weighs 144 lbs. His BMI is 26.3, [place] sleepiness scale 6 and neck circumference is - cm. Relevant medical history includes appears minimal. A diagnostic polysomnogram was performed to evaluate for sleep apnea.     Polysomnogram Data: A full night polysomnogram recorded the standard physiologic parameters including EEG, EOG, EMG, ECG, nasal and oral airflow. Respiratory parameters of chest and abdominal movements were recorded with respiratory inductance plethysmography. Oxygen saturation was recorded by pulse oximetry. Hypopnea scoring rule used: 1B 4%.     Sleep Architecture: Delayed REM latency, decreased percentages of N3 and REM, minimal amount of supine REM observed.  Highly increased arousal index.  The total recording time of the polysomnogram was 473.9 minutes. The total sleep time was 324.0 minutes. Sleep latency was normal at 23.4 minutes without the use of a sleep aid. REM latency was 163.5 minutes. Arousal index was increased at 79.6 arousals per hour. Sleep efficiency was decreased at 68.4%. Wake after sleep onset was 126.5 minutes. The patient spent 2.3% of total sleep time in Stage N1, 85.0% in Stage N2, 9.3% in Stage N3, and 3.4% in REM. Time in REM supine was 2.0 minutes.     Respiration: Severe SOCORRO (AHI 46.7) without sleep-associated hypoxemia.  Potential that severity under-reported given low percentage of REM and minimal supine REM observed.  Events ? The polysomnogram revealed a presence of 233 obstructive, - central, and - mixed apneas resulting in an apnea index of 43.1 events per hour. There were  19 obstructive hypopneas and - central hypopneas resulting in an obstructive hypopnea index of 3.5 and central hypopnea index of - events per hour. The combined apnea/hypopnea index was 46.7 events per hour (central apnea/hypopnea index was - events per hour). The REM AHI was 21.8 events per hour. The supine AHI was 61.2 events per hour. The RERA index was - events per hour.  The RDI was 46.7 events per hour.  Snoring - was reported as loud.  Respiratory rate and pattern - was notable for normal respiratory rate and pattern.  Sustained Sleep Associated Hypoventilation - Transcutaneous carbon dioxide monitoring was not used, however significant hypoventilation was not suggested by oximetry.  Sleep Associated Hypoxemia - (Greater than 5 minutes O2 sat at or below 88%) was not present. Baseline oxygen saturation was 94.0%. Lowest oxygen saturation was 80.0%. Time spent less than or equal to 88% was 2.6 minutes. Time spent less than or equal to 89% was 5.3 minutes.     Movement Activity: Nothing of note  Periodic Limb Activity - There were - PLMs during the entire study. The PLM index was - movements per hour. The PLM Arousal Index was - per hour.  REM EMG Activity - Excessive transient/sustained muscle activity was not present.  Nocturnal Behavior - Abnormal sleep related behaviors were not noted during/arising out of NREM / REM sleep.   Bruxism - None apparent.     Cardiac Summary: Appears NSR  The average pulse rate was 70.0 bpm. The minimum pulse rate was 49.0 bpm while the maximum pulse rate was 139.0 bpm.       Assessment:   Delayed REM latency, decreased percentages of N3 and REM, minimal amount of supine REM observed.  Highly increased arousal index.  Severe SOCORRO (AHI 46.7) without sleep-associated hypoxemia.  Potential that severity under-reported given low percentage of REM and minimal supine REM observed.     Recommendations:  Consider repeat polysomnography with full night titration of positive airway  "pressure therapy for the control of sleep disordered breathing.  Based on the presence of severe obstructive sleep apnea without significant hypoxemia, treatment could be empirically initiated with Auto?titrating PAP therapy with a range of 5 to 15 cmH2O. Recommend clinical follow up with sleep management team.  Advice regarding the risks of drowsy driving.  Suggest optimizing sleep schedule and avoiding sleep deprivation.     Diagnostic Codes:   Obstructive Sleep Apnea G47.33     _____________________________________   Electronically Signed By: Atilio Riggs MD (1/3/2025)       Past medical history:    There are no active problems to display for this patient.      10 point ROS of systems including Constitutional, Eyes, Respiratory, Cardiovascular, Gastroenterology, Genitourinary, Integumentary, Muscularskeletal, Psychiatric were all negative except for pertinent positives noted in my HPI.    Current Outpatient Medications   Medication Sig Dispense Refill    ibuprofen (ADVIL/MOTRIN) 800 MG tablet Take 800 mg by mouth every 8 hours as needed for moderate pain      UNKNOWN TO PATIENT OTC sleep medication \"with tylenol\"         OBJECTIVE:  There were no vitals taken for this visit.    Physical Exam     ---  This note was written with the assistance of the Dragon voice-dictation technology software. The final document, although reviewed, may contain errors. For corrections, please contact the office.    Total time spent preparing to see the patient, review of chart, obtaining history and physical examination, review of sleep testing, review of treatment options, education, discussion with patient and documenting in Epic / EMR was 25 minutes.  All time involved was spent on the day of service for the patient (the same day as the patient's appointment).    Atilio Riggs MD    Sleep Medicine  [place], [place]  Main Office: [phone]  [facility], [facility]  [address], [facility] [place], [address]  Schedule visits: [phone]  Main Office: [phone]  Fax: [phone]

## 2025-01-07 ENCOUNTER — VIRTUAL VISIT (OUTPATIENT)
Dept: PULMONOLOGY | Facility: OTHER | Age: 35
End: 2025-01-07
Attending: FAMILY MEDICINE

## 2025-01-07 VITALS — HEIGHT: 61 IN | BODY MASS INDEX: 25.68 KG/M2 | WEIGHT: 136 LBS

## 2025-01-07 DIAGNOSIS — G47.10 HYPERSOMNIA: ICD-10-CM

## 2025-01-07 DIAGNOSIS — G47.01 INSOMNIA DUE TO MEDICAL CONDITION: ICD-10-CM

## 2025-01-07 DIAGNOSIS — G47.33 OSA (OBSTRUCTIVE SLEEP APNEA): Primary | ICD-10-CM

## 2025-01-07 ASSESSMENT — PAIN SCALES - GENERAL: PAINLEVEL_OUTOF10: NO PAIN (0)

## 2025-01-07 NOTE — NURSING NOTE
Current patient location: [address] [place], [place] [address]   [place] [address]    Is the patient currently in the state of [place]? YES    Visit mode:VIDEO    If the visit is dropped, the patient can be reconnected by:VIDEO VISIT: Text to cell phone:   Telephone Information:   Mobile [phone]       Will anyone else be joining the visit? NO  (If patient encounters technical issues they should call [phone])    Are changes needed to the allergy or medication list? No    Are refills needed on medications prescribed by this physician? NO    Rooming Documentation:  Questionnaire(s) completed    Reason for visit: RECHECK    Giacomo CAPUTOF

## 2025-01-07 NOTE — PROGRESS NOTES
"Virtual Visit Details    Type of service:  Video Visit     Originating Location (pt. Location): {video visit patient location:082875::\"Home\"}  {PROVIDER LOCATION On-site should be selected for visits conducted from your clinic location or adjoining [facility] hospital, academic office, or other nearby [facility] building. Off-site should be selected for all other provider locations, including home:248609}  Distant Location (provider location):  {virtual location provider:266633}  Platform used for Video Visit: {Virtual Visit Platforms:797562::\"Viddyad\"}    "

## 2025-01-08 PROBLEM — G47.33 OSA (OBSTRUCTIVE SLEEP APNEA): Status: ACTIVE | Noted: 2025-01-08

## 2025-02-11 ENCOUNTER — DOCUMENTATION ONLY (OUTPATIENT)
Dept: HOME HEALTH SERVICES | Facility: CLINIC | Age: 35
End: 2025-02-11

## 2025-02-11 NOTE — PROGRESS NOTES
Patient was offered choice of vendor and chose [facility].  Patient Ryan HELTON Rosanna was set up at [employer] [place] on February 6, 2025. Patient received a Resmed Airsense 11 Pressures were set at  5-15 cm H2O.   Patient s ramp is off and FLEX/EPR is 2.  Patient received a Resmed Mask name: [id number]  Pillow mask size Standard, heated tubing and heated humidifier.  Patient has the following compliance requirements: using and visit requirements  Patient has a follow up on 3/25/2025 with Dr. Riggs.    jacques Oakley

## 2025-03-23 NOTE — PROGRESS NOTES
"Ryan Marte is a 35 year old male who is being evaluated via a billable video visit.       The patient has been notified of following:      \"This video visit will be conducted via a call between you and your physician/provider. We have found that certain health care needs can be provided without the need for an in-person physical exam.  This service lets us provide the care you need with a video conversation.  If a prescription is necessary we can send it directly to your pharmacy.  If lab work is needed we can place an order for that and you can then stop by our lab to have the test done at a later time.     Video visits are billed at different rates depending on your insurance coverage.  Please reach out to your insurance provider with any questions.     If during the course of the call the physician/provider feels a video visit is not appropriate, you will not be charged for this service.\"     Patient has given verbal consent for Video visit? Yes  How would you like to obtain your AVS? Mail a copy  If you are dropped from the video visit, the video invite should be resent to: Text to cell phone: -  Will anyone else be joining your video visit? No  If patient encounters technical issues they should call [phone]      Video-Visit Details     Type of service:  Video Visit     Start Time:  1pm  End Time:  1:20pm    Originating Location (pt. Location): Home     Distant Location (provider location):  [facility]       Platform used for Video Visit: [employer]use    Virtual visit for CPAP compliance follow-up.     A/P:     1.)  Severe SOCORRO (AHI 46.7) without sleep-associated hypoxemia.   - Potential that severity under-reported given low percentage of REM and minimal supine REM observed.      2.)  Hypersomnia -stable but not at goal     3.) chronic sleep maintenance insomnia -stable not at goal     We discussed that I would recommend treatment to reduce long-term cardiovascular risk factors, as " "well as his his symptoms of disrupted sleep and daytime sleepiness.     Noted for significant improvement in AHI with CPAP auto titrate 5-9 cm H2O.  There is a mild residual AHI of 10, though this appears to be predominantly central apneas per CPAP download.    He is noting significant daytime benefit with reduction in fatigue improved sleep quality.    We agreed to continue on current CPAP settings of auto titrate 5-9 cm H2O, will continue to closely monitor AHI with follow-up in 3 months.  I also recommended he reach out to [facility] [place] [place] to get a replacement nasal pillow cushion.    SUBJECTIVE:  Ryan Marte is a 35 year old male.    Reviewed scanned notes from Ching Brown CNP with [facility] on 2024.  Concerns of loud snoring, observed apnea, daytime fatigue.  Minimal PMHx.     Pertinent PMHx appears ADHD.    Prior sleep testin2024-PSG with weight 144 pounds, BMI 26.3.  Severe SOCORRO (AHI 46.7) without sleep-associated hypoxemia. Potential that severity under-reported given low percentage of REM and minimal supine REM observed.     2025 -we reviewed his sleep study results in more detail.  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  He will typically stay on an overnight awake and daytime sleeping pattern on his off days.     He also asked regarding concerns from his Apple Watch of reports of bradycardia.  Heart rates between 48-55 bpm detected during sleep, we discussed that this would be considered in the normal range for sleep.     Chief concern per questionnaire: \"To check if i have sleep apnea or not \"     Duration of symptoms:  \"Sometime in the last few years \"     Goals for visit per questionnaire: \"To find out if i have sleep apnea or something else \"     Sleep pattern:  Yes for shift work  Workdays.  9-10am to 10pm  Weekends.  Noon - 1am.  Time to fall asleep: ~10 minutes.  Awakenings: 5-6 times per night, 10-15 minutes to return to " "sleep.  Average total sleep time:  3-5 hours (?)  Napping.  5 days per week, 5 hours per nap.     No for RLS screen.  No for sleep walking.  No for dream enactment behavior.  No for bruxism.     Yes for sleep paralysis.     No for morning headaches.  Yes for snoring.  Yes for observed apnea.  Yes for FHx of SOCORRO - grandfather.     Caffeine use:  No for 3+ per day.  No for within 6 hours of bed.    A/P to start CPAP auto titrate 5-15 cm H2O.    Today -overall, he has noted daytime benefit send starting the CPAP and would like to continue benefits have been reduction in fatigue and improved sleep quality.  He is using a nasal pillows interface and seems like it has worked well, but seems to have been harder to get a good seal over the last couple of nights, he has not yet replaced his cushion.    CPAP download reviewed from 2/21/2025 through 3/22/2025 on auto titrate 5-9 cm H2O.  Used 30 of 30 days, average daily usage 6 hours 59 minutes.  Pressure median 7.5, 95th percentile 8.7 cm H2O.  AHI 10.2 with AI 9 (central AI 8.4, obstructive AI 0.3, unknown AI 0.1).    Past medical history:    Patient Active Problem List    Diagnosis Date Noted    SOCORRO (obstructive sleep apnea) - severe 01/08/2025     Priority: Medium       10 point ROS of systems including Constitutional, Eyes, Respiratory, Cardiovascular, Gastroenterology, Genitourinary, Integumentary, Muscularskeletal, Psychiatric were all negative except for pertinent positives noted in my HPI.    Current Outpatient Medications   Medication Sig Dispense Refill    ibuprofen (ADVIL/MOTRIN) 800 MG tablet Take 800 mg by mouth every 8 hours as needed for moderate pain      UNKNOWN TO PATIENT OTC sleep medication \"with tylenol\"         OBJECTIVE:  There were no vitals taken for this visit.    Physical Exam     ---  This note was written with the assistance of the Dragon voice-dictation technology software. The final document, although reviewed, may contain errors. For " corrections, please contact the office.    Total time spent preparing to see the patient, review of chart, obtaining history and physical examination, review of sleep testing, review of treatment options, education, discussion with patient and documenting in Epic / EMR was 25 minutes.  All time involved was spent on the day of service for the patient (the same day as the patient's appointment).    Atilio Riggs MD    Sleep Medicine  [place], [place]  Main Office: [phone]  [place] [facility] [place]  [address] [place] [address], [address]  Schedule visits: [phone]  Main Office: [phone]  Fax: [phone]

## 2025-03-25 ENCOUNTER — VIRTUAL VISIT (OUTPATIENT)
Dept: PULMONOLOGY | Facility: OTHER | Age: 35
End: 2025-03-25
Attending: FAMILY MEDICINE
Payer: COMMERCIAL

## 2025-03-25 VITALS — HEIGHT: 61 IN | BODY MASS INDEX: 25.86 KG/M2 | WEIGHT: 137 LBS

## 2025-03-25 DIAGNOSIS — G47.10 HYPERSOMNIA: ICD-10-CM

## 2025-03-25 DIAGNOSIS — G47.33 OSA (OBSTRUCTIVE SLEEP APNEA): Primary | ICD-10-CM

## 2025-03-25 DIAGNOSIS — G47.01 INSOMNIA DUE TO MEDICAL CONDITION: ICD-10-CM

## 2025-03-25 ASSESSMENT — PAIN SCALES - GENERAL: PAINLEVEL_OUTOF10: NO PAIN (0)

## 2025-03-25 ASSESSMENT — PATIENT HEALTH QUESTIONNAIRE - PHQ9: SUM OF ALL RESPONSES TO PHQ QUESTIONS 1-9: 14

## 2025-03-25 NOTE — PROGRESS NOTES
CPAP Compliance Visit:       Name: Ryan Marte MRN# [id number]   Age: 35 year old YOB: 1990     Date of Consultation: March 25, 2025  Primary care provider: Ching Brown    Compliance:   83 % of days with >4 hours of use.   0 days/30 with no use   Average use: 7 hours 12 minutes per day   95%ile leak 1.3 L/min   CPAP 95% pressure 5-9 cm   AHI 10.4 events/hour   SARAH 8.6  PB 1%     Impression:   Patient is {CPAP Compliance :383966}

## 2025-03-25 NOTE — NURSING NOTE
Depression Response    Patient completed the PHQ-9 assessment for depression and scored >9? Yes  Question 9 on the PHQ-9 was positive for suicidality? No  Does patient have current mental health provider? No    Is this a virtual visit? Yes   Does patient have suicidal ideation (positive question 9)? No - offer to place Mental Health Referral.  Patient declined referral/not needed    I personally notified the following: visit provider  Current patient location: [address] [place] [address] [address]   [place] [address]    Is the patient currently in the state of [place]? YES    Visit mode: VIDEO    If the visit is dropped, the patient can be reconnected by:VIDEO VISIT: Text to cell phone:   Telephone Information:   Mobile [phone]       Will anyone else be joining the visit? NO  (If patient encounters technical issues they should call [phone])    Are changes needed to the allergy or medication list? No    Are refills needed on medications prescribed by this physician? NO    Rooming Documentation:  Questionnaire(s) completed    Reason for visit: RECHECK    Giacomo CAPUTOF

## 2025-03-25 NOTE — PROGRESS NOTES
"Virtual Visit Details    Type of service:  Video Visit     Originating Location (pt. Location): {video visit patient location:712492::\"Home\"}  {PROVIDER LOCATION On-site should be selected for visits conducted from your clinic location or adjoining [facility] hospital, academic office, or other nearby [facility] building. Off-site should be selected for all other provider locations, including home:295821}  Distant Location (provider location):  {virtual location provider:466211}  Platform used for Video Visit: {Virtual Visit Platforms:593758::\"[employer]\"}    "

## 2025-06-17 ENCOUNTER — VIRTUAL VISIT (OUTPATIENT)
Dept: PULMONOLOGY | Facility: OTHER | Age: 35
End: 2025-06-17
Attending: FAMILY MEDICINE
Payer: COMMERCIAL

## 2025-06-17 VITALS — WEIGHT: 143.2 LBS | BODY MASS INDEX: 27.03 KG/M2 | HEIGHT: 61 IN

## 2025-06-17 DIAGNOSIS — G47.01 INSOMNIA DUE TO MEDICAL CONDITION: ICD-10-CM

## 2025-06-17 DIAGNOSIS — G47.33 OSA (OBSTRUCTIVE SLEEP APNEA): Primary | ICD-10-CM

## 2025-06-17 ASSESSMENT — PAIN SCALES - GENERAL: PAINLEVEL_OUTOF10: MODERATE PAIN (6)

## 2025-06-17 NOTE — NURSING NOTE
Current patient location: [address] [place], [place] [address]   [place] [address]    Is the patient currently in the state of [place]? YES    Visit mode: VIDEO    If the visit is dropped, the patient can be reconnected by:VIDEO VISIT: Text to cell phone:   Telephone Information:   Mobile [phone]       Will anyone else be joining the visit? NO  (If patient encounters technical issues they should call [phone]    Are changes needed to the allergy or medication list? No    Are refills needed on medications prescribed by this physician? NO    Rooming Documentation:  Questionnaire(s) completed    Reason for visit: RECHECK    Brisa CAPUTOF

## 2025-06-17 NOTE — PROGRESS NOTES
"Virtual Visit Details    Type of service:  Video Visit     Originating Location (pt. Location): Home    Distant Location (provider location):  On-site  Platform used for Video Visit: Anoop      Ryan Marte is a 35 year old male who is being evaluated via a billable video visit.       The patient has been notified of following:      \"This video visit will be conducted via a call between you and your physician/provider. We have found that certain health care needs can be provided without the need for an in-person physical exam.  This service lets us provide the care you need with a video conversation.  If a prescription is necessary we can send it directly to your pharmacy.  If lab work is needed we can place an order for that and you can then stop by our lab to have the test done at a later time.     Video visits are billed at different rates depending on your insurance coverage.  Please reach out to your insurance provider with any questions.     If during the course of the call the physician/provider feels a video visit is not appropriate, you will not be charged for this service.\"     Patient has given verbal consent for Video visit? Yes  How would you like to obtain your AVS? Mail a copy  If you are dropped from the video visit, the video invite should be resent to: Text to cell phone: -  Will anyone else be joining your video visit? No  If patient encounters technical issues they should call [phone]      Video-Visit Details     Type of service:  Video Visit     Start Time: 0800  End Time: 0820    Originating Location (pt. Location): Home     Distant Location (provider location):  [facility] [place]       Platform used for Video Visit: Anoop    Virtual visit for follow-up of SOCORRO.     A/P:     1.)  Severe SOCORRO (AHI 46.7) without sleep-associated hypoxemia.   - Potential that severity under-reported given low percentage of REM and minimal supine REM observed.      2.)  Hypersomnia -stable " "but not at goal     3.) chronic sleep maintenance insomnia -stable not at goal     We discussed that I would recommend treatment to reduce long-term cardiovascular risk factors, as well as his his symptoms of disrupted sleep and daytime sleepiness.     Noted for significant improvement in AHI with CPAP auto titrate 5-9 cm H2O.  There is a mild residual AHI of 10, though this appears to be predominantly central apneas per CPAP download.     He is noting significant daytime benefit with reduction in fatigue improved sleep quality.    We discussed that there is a potential for some mild underlying complex sleep apnea, but given that he feels he is doing very well with significant daytime benefit we agreed to monitor at this time.    Our plan for today:  Continue CPAP auto titrate 5-9 cm H2O with room air.  Plan for follow-up in 6 months.    SUBJECTIVE:  Ryan Marte is a 35 year old male.    Reviewed scanned notes from Ching Brown CNP with [facility] on 2024.  Concerns of loud snoring, observed apnea, daytime fatigue.  Minimal PMHx.     Pertinent PMHx appears ADHD.     Prior sleep testin2024-PSG with weight 144 pounds, BMI 26.3.  Severe SOCORRO (AHI 46.7) without sleep-associated hypoxemia. Potential that severity under-reported given low percentage of REM and minimal supine REM observed.     2025 -we reviewed his sleep study results in more detail.  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  He will typically stay on an overnight awake and daytime sleeping pattern on his off days.     He also asked regarding concerns from his Apple Watch of reports of bradycardia.  Heart rates between 48-55 bpm detected during sleep, we discussed that this would be considered in the normal range for sleep.     Chief concern per questionnaire: \"To check if i have sleep apnea or not \"     Duration of symptoms:  \"Sometime in the last few years \"     Goals " "for visit per questionnaire: \"To find out if i have sleep apnea or something else \"     Sleep pattern:  Yes for shift work  Workdays.  9-10am to 10pm  Weekends.  Noon - 1am.  Time to fall asleep: ~10 minutes.  Awakenings: 5-6 times per night, 10-15 minutes to return to sleep.  Average total sleep time:  3-5 hours (?)  Napping.  5 days per week, 5 hours per nap.     No for RLS screen.  No for sleep walking.  No for dream enactment behavior.  No for bruxism.     Yes for sleep paralysis.     No for morning headaches.  Yes for snoring.  Yes for observed apnea.  Yes for FHx of SOCORRO - grandfather.     Caffeine use:  No for 3+ per day.  No for within 6 hours of bed.     A/P to start CPAP auto titrate 5-15 cm H2O.     3/25/2025 -overall, he has noted daytime benefit send starting the CPAP and would like to continue benefits have been reduction in fatigue and improved sleep quality.  He is using a nasal pillows interface and seems like it has worked well, but seems to have been harder to get a good seal over the last couple of nights, he has not yet replaced his cushion.     CPAP download reviewed from 2/21/2025 through 3/22/2025 on auto titrate 5-9 cm H2O.  Used 30 of 30 days, average daily usage 6 hours 59 minutes.  Pressure median 7.5, 95th percentile 8.7 cm H2O.  AHI 10.2 with AI 9 (central AI 8.4, obstructive AI 0.3, unknown AI 0.1).    A/P to continue CPAP auto 5-9, will monitor elevated AHI with predominantly central apnea events.    Today -overall, he feels he continues to note significant benefit from use of the CPAP and no specific concerns today.  There is been continued resolution of headaches, feels well rested.    CPAP download reviewed from 5/17/2025 through 6/15/2025 on auto titrate 5-9 cm H2O.  Used 23 of 30 days, average daily usage 4 hours 51 minutes and average on days used 6 hours 20 minutes.  Pressure median 7.1, 95th percentile 8.5 cm H2O.  AHI 10.9 with AI 9.5 (central AI 8.9, obstructive AI 0.5, " "unknown AI 0.1).    Past medical history:    Patient Active Problem List    Diagnosis Date Noted    SOCORRO (obstructive sleep apnea) - severe 01/08/2025     Priority: Medium       10 point ROS of systems including Constitutional, Eyes, Respiratory, Cardiovascular, Gastroenterology, Genitourinary, Integumentary, Muscularskeletal, Psychiatric were all negative except for pertinent positives noted in my HPI.    Current Outpatient Medications   Medication Sig Dispense Refill    ibuprofen (ADVIL/MOTRIN) 800 MG tablet Take 800 mg by mouth every 8 hours as needed for moderate pain      UNKNOWN TO PATIENT OTC sleep medication \"with tylenol\"         OBJECTIVE:  There were no vitals taken for this visit.    Physical Exam     ---  This note was written with the assistance of the Dragon voice-dictation technology software. The final document, although reviewed, may contain errors. For corrections, please contact the office.    Total time spent preparing to see the patient, review of chart, obtaining history and physical examination, review of sleep testing, review of treatment options, education, discussion with patient and documenting in Epic / EMR was 25 minutes.  All time involved was spent on the day of service for the patient (the same day as the patient's appointment).    Atilio Riggs MD    Sleep Medicine  [facility]  - [place], [place]  Main Office: [phone]  [facility] [place], [place]  [address], [address]  Schedule visits: [phone]  Main Office: [phone]  Fax: [phone]    "

## 2025-07-13 ENCOUNTER — HEALTH MAINTENANCE LETTER (OUTPATIENT)
Age: 35
End: 2025-07-13